# Patient Record
Sex: MALE | Race: WHITE
[De-identification: names, ages, dates, MRNs, and addresses within clinical notes are randomized per-mention and may not be internally consistent; named-entity substitution may affect disease eponyms.]

---

## 2018-08-16 ENCOUNTER — HOSPITAL ENCOUNTER (INPATIENT)
Dept: HOSPITAL 97 - ER | Age: 53
LOS: 1 days | Discharge: HOME | DRG: 281 | End: 2018-08-17
Attending: INTERNAL MEDICINE | Admitting: INTERNAL MEDICINE
Payer: SELF-PAY

## 2018-08-16 VITALS — BODY MASS INDEX: 31.2 KG/M2

## 2018-08-16 DIAGNOSIS — I10: ICD-10-CM

## 2018-08-16 DIAGNOSIS — I25.728: ICD-10-CM

## 2018-08-16 DIAGNOSIS — Z95.5: ICD-10-CM

## 2018-08-16 DIAGNOSIS — E78.5: ICD-10-CM

## 2018-08-16 DIAGNOSIS — I21.4: Primary | ICD-10-CM

## 2018-08-16 DIAGNOSIS — F17.200: ICD-10-CM

## 2018-08-16 DIAGNOSIS — K21.9: ICD-10-CM

## 2018-08-16 DIAGNOSIS — I25.718: ICD-10-CM

## 2018-08-16 DIAGNOSIS — I25.2: ICD-10-CM

## 2018-08-16 LAB
BUN BLD-MCNC: 11 MG/DL (ref 7–18)
GLUCOSE SERPLBLD-MCNC: 158 MG/DL (ref 74–106)
HCT VFR BLD CALC: 43.1 % (ref 39.6–49)
INR BLD: 0.97
LYMPHOCYTES # SPEC AUTO: 1.9 K/UL (ref 0.7–4.9)
MAGNESIUM SERPL-MCNC: 2.1 MG/DL (ref 1.8–2.4)
MCH RBC QN AUTO: 29.9 PG (ref 27–35)
MCV RBC: 86.5 FL (ref 80–100)
NT-PROBNP SERPL-MCNC: 195 PG/ML (ref ?–125)
PMV BLD: 9 FL (ref 7.6–11.3)
POTASSIUM SERPL-SCNC: 3.8 MMOL/L (ref 3.5–5.1)
RBC # BLD: 4.98 M/UL (ref 4.33–5.43)
UA DIPSTICK W REFLEX MICRO PNL UR: (no result)

## 2018-08-16 PROCEDURE — 96372 THER/PROPH/DIAG INJ SC/IM: CPT

## 2018-08-16 PROCEDURE — 80048 BASIC METABOLIC PNL TOTAL CA: CPT

## 2018-08-16 PROCEDURE — 81003 URINALYSIS AUTO W/O SCOPE: CPT

## 2018-08-16 PROCEDURE — 71045 X-RAY EXAM CHEST 1 VIEW: CPT

## 2018-08-16 PROCEDURE — 84484 ASSAY OF TROPONIN QUANT: CPT

## 2018-08-16 PROCEDURE — 83880 ASSAY OF NATRIURETIC PEPTIDE: CPT

## 2018-08-16 PROCEDURE — 83735 ASSAY OF MAGNESIUM: CPT

## 2018-08-16 PROCEDURE — 85610 PROTHROMBIN TIME: CPT

## 2018-08-16 PROCEDURE — 93005 ELECTROCARDIOGRAM TRACING: CPT

## 2018-08-16 PROCEDURE — 93306 TTE W/DOPPLER COMPLETE: CPT

## 2018-08-16 PROCEDURE — 85025 COMPLETE CBC W/AUTO DIFF WBC: CPT

## 2018-08-16 PROCEDURE — 99285 EMERGENCY DEPT VISIT HI MDM: CPT

## 2018-08-16 PROCEDURE — 36415 COLL VENOUS BLD VENIPUNCTURE: CPT

## 2018-08-16 PROCEDURE — 93458 L HRT ARTERY/VENTRICLE ANGIO: CPT

## 2018-08-16 RX ADMIN — BUSPIRONE HYDROCHLORIDE SCH MG: 15 TABLET ORAL at 20:35

## 2018-08-16 RX ADMIN — CARVEDILOL SCH MG: 3.12 TABLET, FILM COATED ORAL at 20:35

## 2018-08-16 NOTE — EKG
Test Date:    2018-08-16               Test Time:    10:55:01

Technician:   GREGORIO                                     

                                                     

MEASUREMENT RESULTS:                                       

Intervals:                                           

Rate:         57                                     

WA:           142                                    

QRSD:         76                                     

QT:           414                                    

QTc:          402                                    

Axis:                                                

P:            29                                     

WA:           142                                    

QRS:          -20                                    

T:            84                                     

                                                     

INTERPRETIVE STATEMENTS:                                       

                                                     

Sinus bradycardia

Inferior infarct, age undetermined

Abnormal ECG

No previous ECG available for comparison



Electronically Signed On 08-16-18 11:36:29 CDT by Kaleb Campos

## 2018-08-16 NOTE — P.HP
Certification for Inpatient


Patient admitted to: Inpatient


With expected LOS: >2 Midnights


Patient will require the following post-hospital care: None


Practitioner: I am a practitioner with admitting privileges, knowledge of 

patient current condition, hospital course, and medical plan of care.


Services: Services provided to patient in accordance with Admission 

requirements found in Title 42 Section 412.3 of the Code of Federal Regulations





Patient History


Date of Service: 08/16/18


Reason for admission: chest pain


History of Present Illness: 





54 y/o man with HTN CAD s/p CABG(5 years ago) who presented ER chest pain this 

morning. Crushing, heaviness, associated diaphoresis. 8/1-10. Lasted hours, 

subsided in ER. First tropnin 0.12. When I saw him in ER, he has no more chest 

pain.





States he was doing well after CABG but he has not seen a cardiology for years. 


Allergies





No Known Allergies Allergy (Unverified 08/16/18 13:28)


 





Home Medications: 








Atorvastatin Calcium [Lipitor] 80 mg PO DAILY 08/16/18 


Buspirone HCl 1 tab PO BID 08/16/18 


Carvedilol 3.125 mg PO BID 08/16/18 


Clopidogrel Bisulfate [Plavix] 75 mg PO DAILY 08/16/18 


Hydrocodone 10/APAP 325 [Norco 10/325*] 1 tab PO BID PRN 08/16/18 


Loratadine [Claritin] 10 mg PO DAILY 08/16/18 


Omeprazole 20 mg PO BEDTIME 08/16/18 








- Past Medical/Surgical History


Has patient received pneumonia vaccine in the past: Yes


Diabetic: No


-: MI


-: CABG


-: Knee Sx





- Family History


  ** Father


History Unknown: Yes





- Social History


Smoking Status: Current every day smoker


Alcohol use: No


CD- Drugs: No


Caffeine use: Yes


Place of Residence: Home





Review of Systems


10-point ROS is otherwise unremarkable





Physical Examination





- Vital Signs


Temperature: 97.7 F


Blood Pressure: 141/82


Pulse: 66


Respirations: 16


Pulse Ox (%): 98





- Physical Exam


General: Alert, In no apparent distress


HEENT: Atraumatic, PERRLA, Mucous membr. moist/pink, EOMI, Sclerae nonicteric


Neck: Supple, 2+ carotid pulse no bruit, No LAD, Without JVD or thyroid 

abnormality


Respiratory: Clear to auscultation bilaterally, Normal air movement


Cardiovascular: Regular rate/rhythm, Normal S1 S2


Gastrointestinal: Normal bowel sounds, No tenderness


Musculoskeletal: No tenderness


Integumentary: No rashes


Neurological: Normal gait, Normal speech, Normal strength at 5/5 x4 extr, 

Normal tone, Normal affect


Lymphatics: No axilla or inguinal lymphadenopathy





- Studies


Laboratory Data (last 24 hrs)





08/16/18 11:10: PT 11.4, INR 0.97


08/16/18 11:10: WBC 8.2, Hgb 14.9, Hct 43.1, Plt Count 276


08/16/18 11:10: Sodium 140, Potassium 3.8, BUN 11, Creatinine 0.90, Glucose 158 

H, Magnesium 2.1








Assessment and Plan





- Problems (Diagnosis)


(1) Chest pain


Current Visit: Yes   Status: Acute   


Qualifiers: 


   Chest pain type: precordial pain   Qualified Code(s): R07.2 - Precordial 

pain   





(2) CAD (coronary artery disease) of artery bypass graft


Current Visit: Yes   Status: Chronic   


Qualifiers: 


   Native vs. transplanted heart: native heart   Associated angina: with stable 

angina   Qualified Code(s): I25.708 - Atherosclerosis of coronary artery bypass 

graft(s), unspecified, with other forms of angina pectoris   





(3) Hypertension


Current Visit: Yes   Status: Chronic   


Qualifiers: 


   Hypertension type: essential hypertension   Qualified Code(s): I10 - 

Essential (primary) hypertension   





- Plan





--ASA/Plaxix


--Lovenox


--Troponins x3


--ECHO


--Morphine PRN


--Resume home meds


--Cons Cardilogy





- Advance Directives


Does patient have a Living Will: No


Does patient have a Durable POA for Healthcare: No

## 2018-08-16 NOTE — RAD REPORT
EXAM DESCRIPTION:  RAD - Chest Single View - 8/16/2018 11:31 am

 

CLINICAL HISTORY:  PAIN

Chest pain.

 

COMPARISON:  No comparisons

 

FINDINGS:  Portable technique limits examination quality.

 

The lungs are grossly clear. The heart is normal in size. No displaced fractures.Sternotomy wires.

 

IMPRESSION:  No acute intrathoracic process suspected.

## 2018-08-16 NOTE — ER
Nurse's Notes                                                                                     

 [facility]                                                                

Name: Dayne Wells                                                                                  

Age: 53 yrs                                                                                       

Sex: Male                                                                                         

: 1965                                                                                   

MRN: [id number]                                                                                   

Arrival Date: 2018                                                                          

Time: 10:44                                                                                       

Account#: [id number]                                                                            

Bed 20                                                                                            

Private MD:                                                                                       

Diagnosis: Unstable angina                                                                        

                                                                                                  

Presentation:                                                                                     

                                                                                             

10:48 Presenting complaint: Patient states: c/o chest pressure that radiates to the left jaw. rb1 

      Pain is 6/10. Feels lightheaded, dizzy and is diaphoretic. Transition of care: patient      

      was not received from another setting of care. Risk Assessment: Do you want to hurt         

      yourself or someone else? Patient reports no desire to harm self or others. Initial         

      Sepsis Screen: Does the patient meet any 2 criteria? No. Patient's initial sepsis           

      screen is negative. Does the patient have a suspected source of infection? No.              

      Patient's initial sepsis screen is negative. Care prior to arrival: None.                   

10:48 Method Of Arrival: Wheelchair                                                           rb1 

10:48 Acuity: RENEE 3                                                                           rb1 

10:48 Onset of symptoms was 2018 at 05:30.                                         rb1 

                                                                                                  

Triage Assessment:                                                                                

10:48 General: Appears uncomfortable, Behavior is calm, cooperative, Denies fever. Pain:      rb1 

      Complains of pain in mid-sternal area Pain radiates to left jaw Pain currently is 6 out     

      of 10 on a pain scale. Quality of pain is described as pressure. Neuro: Level of            

      Consciousness is awake, alert, obeys commands, Oriented to person, place, time,             

      situation. Neuro: Reports dizziness, lightheaded. Cardiovascular: Capillary refill < 3      

      seconds is brisk in bilateral fingers Rhythm is sinus rhythm. Respiratory: Airway is        

      patent Respiratory effort is even, unlabored, Respiratory pattern is regular,               

      symmetrical. GI: No signs and/or symptoms were reported involving the gastrointestinal      

      system. : No signs and/or symptoms were reported regarding the genitourinary system.      

      Derm: Skin is diaphoretic, Skin is normal, Skin temperature is warm. Musculoskeletal:       

      Range of motion: intact in all extremities.                                                 

                                                                                                  

Historical:                                                                                       

- Allergies:                                                                                      

10:48 No Known Allergies;                                                                     rb1 

- Home Meds:                                                                                      

10:48 Coreg Oral [Active]; Lisinopril Oral [Active]; Plavix Oral [Active]; atorvastatin oral  rb1 

      oral [Active];                                                                              

- PMHx:                                                                                           

10:48 Myocardial infarction;                                                                  rb1 

- PSHx:                                                                                           

10:48 CABG; Knee surgery;                                                                     rb1 

                                                                                                  

- Immunization history:: Adult Immunizations up to date.                                          

- Social history:: Smoking status: Patient uses tobacco products, SMOKES MARIJUANA                

  DAILY.                                                                                          

- Ebola Screening: : Patient negative for fever greater than or equal to 101.5 degrees            

  Fahrenheit, and additional compatible Ebola Virus Disease symptoms.                             

                                                                                                  

                                                                                                  

Screening:                                                                                        

10:48 Abuse screen: Denies threats or abuse. Nutritional screening: No deficits noted.        rb1 

      Tuberculosis screening: No symptoms or risk factors identified. Fall Risk None              

      identified.                                                                                 

                                                                                                  

Assessment:                                                                                       

10:48 General: See triage assessment.                                                         rb1 

10:48 Pain: Pain began 0530 this morning.                                                     rb1 

11:47 Reassessment: Patient appears in no apparent distress at this time. Patient and/or      rb1 

      family updated on plan of care and expected duration. Pain level reassessed. Patient is     

      alert, oriented x 3, equal unlabored respirations, skin warm/dry/pink. Patient states       

      feeling better.                                                                             

12:47 Reassessment: Patient appears in no apparent distress at this time. No changes from     rb1 

      previously documented assessment. Family at bedside.                                        

13:35 Reassessment: Patient appears in no apparent distress at this time. Patient and/or      rb1 

      family updated on plan of care and expected duration. Pain level reassessed. Patient is     

      alert, oriented x 3, equal unlabored respirations, skin warm/dry/pink. Call light           

      within reach.                                                                               

14:15 Reassessment: Patient appears in no apparent distress at this time. No changes from     rb1 

      previously documented assessment.                                                           

14:35 Reassessment: Called report to CORRINE Coles. Information from the SBAR was given. All        rb1 

      questions asked and answered.                                                               

15:15 Reassessment: Patient appears in no apparent distress at this time. Patient and/or      rb1 

      family updated on plan of care and expected duration. Pain level reassessed. Patient is     

      alert, oriented x 3, equal unlabored respirations, skin warm/dry/pink. Pt. is waiting       

      to be taken to the floor. We have to wait for someone to be available to transport the      

      pt.                                                                                         

                                                                                                  

Vital Signs:                                                                                      

10:48  / 86; Pulse 64; Resp 15; Pulse Ox 98% on R/A; Weight 82.55 kg; Height 5 ft. 4    rb1 

      in. (162.56 cm); Pain 6/10;                                                                 

11:47  / 69; Pulse 57; Resp 12; Pulse Ox 98% on R/A;                                    rb1 

12:45  / 86; Pulse 68; Resp 13; Pulse Ox 97% ;                                          rb1 

13:38  / 80; Pulse 72; Resp 14; Pulse Ox 97% on R/A;                                    rb1 

14:15  / 79; Pulse 71; Resp 21; Pulse Ox 97% on R/A;                                    rb1 

15:15  / 80; Pulse 68; Resp 19; Pulse Ox 98% on R/A;                                    rb1 

10:48 Body Mass Index 31.24 (82.55 kg, 162.56 cm)                                             rb1 

                                                                                                  

ED Course:                                                                                        

10:44 Patient arrived in ED.                                                                  tw3 

10:48 Patient has correct armband on for positive identification. Placed in gown. Bed in low  rb1 

      position. Call light in reach. Side rails up X 1. Cardiac monitor on. Pulse ox on. NIBP     

      on.                                                                                         

10:48 Arm band placed on right wrist.                                                         rb1 

10:48 Patient maintains SpO2 saturation greater than 95% on room air.                         rb1 

10:50 Filemon May MD is Attending Physician.                                              gs  

10:53 Makenna Quan RN is Primary Nurse.                                                   rb1 

10:56 Triage completed.                                                                       rb1 

11:10 Inserted saline lock: 18 gauge in right antecubital area, using aseptic technique.      rb1 

      ,using aseptic technique. Inserted by JUNE Tafoya. Blood collected.                     

11:12 EKG done, by EKG tech. reviewed by Filemon May MD.                                    at1 

11:22 X-ray completed. Portable x-ray completed in exam room. Patient tolerated procedure     ml  

      well.                                                                                       

11:32 XRAY Chest (1 view) In Process Unspecified.                                             EDMS

12:35 Yusuf Neri MD is Hospitalizing Provider.                                              gs  

15:25 No provider procedures requiring assistance completed. Patient admitted, IV remains in  rb1 

      place.                                                                                      

                                                                                                  

Administered Medications:                                                                         

12:55 Drug: Aspirin Chewable Tablet 324 mg Route: PO;                                         rb1 

13:14 Follow up: Response: No adverse reaction                                                rb1 

13:33 Drug: Lovenox 1 mg/kg Route: Sub-Q; Site: right lower abdomen;                          rb1 

13:58 Follow up: Response: No adverse reaction                                                rb1 

                                                                                                  

                                                                                                  

Outcome:                                                                                          

12:35 Decision to Hospitalize by Provider.                                                    gs  

15:25 Patient left the ED.                                                                    rb1 

15:25 Admitted to Tele accompanied by tech, family with patient, via stretcher, room 407,     rb1 

      with chart, Report called to  CORRINE Coles                                                       

15:25 Condition: stable                                                                       rb1 

15:25 Instructed on the need for admit.                                                           

                                                                                                  

Signatures:                                                                                       

Dispatcher [employer]                           EDMS                                                 

Flavia Henry Amanda, EKG Tech              EKG Tat1                                                  

Makenna Quan, RN                     RN   rb1                                                  

Abdulaziz, Pam                                    tw3                                                  

Filemon May MD MD   gs                                                   

                                                                                                  

Corrections: (The following items were deleted from the chart)                                    

15:34 15:33 Patient left the ED. rb1                                                          rb1 

                                                                                                  

**************************************************************************************************

## 2018-08-17 VITALS — OXYGEN SATURATION: 100 %

## 2018-08-17 VITALS — DIASTOLIC BLOOD PRESSURE: 80 MMHG | SYSTOLIC BLOOD PRESSURE: 109 MMHG | TEMPERATURE: 97.2 F

## 2018-08-17 PROCEDURE — B2111ZZ FLUOROSCOPY OF MULTIPLE CORONARY ARTERIES USING LOW OSMOLAR CONTRAST: ICD-10-PCS

## 2018-08-17 PROCEDURE — B2131ZZ FLUOROSCOPY OF MULTIPLE CORONARY ARTERY BYPASS GRAFTS USING LOW OSMOLAR CONTRAST: ICD-10-PCS

## 2018-08-17 PROCEDURE — 4A023N7 MEASUREMENT OF CARDIAC SAMPLING AND PRESSURE, LEFT HEART, PERCUTANEOUS APPROACH: ICD-10-PCS

## 2018-08-17 PROCEDURE — B2151ZZ FLUOROSCOPY OF LEFT HEART USING LOW OSMOLAR CONTRAST: ICD-10-PCS

## 2018-08-17 RX ADMIN — CARVEDILOL SCH MG: 3.12 TABLET, FILM COATED ORAL at 05:43

## 2018-08-17 RX ADMIN — BUSPIRONE HYDROCHLORIDE SCH MG: 15 TABLET ORAL at 09:44

## 2018-08-17 NOTE — P.DS
Admission Date: 08/16/18


Discharge Date: 08/17/18


Disposition: ROUTINE DISCHARGE


Discharge Condition: GOOD


Reason for Admission: chest pain





- Problems


(1) Chest pain


Onset Date: 08/17/18   Current Visit: Yes   Status: Acute   


Qualifiers: 


   Chest pain type: precordial pain   Qualified Code(s): R07.2 - Precordial 

pain   





(2) CAD (coronary artery disease) of artery bypass graft


Onset Date: 08/17/18   Current Visit: Yes   Status: Chronic   


Qualifiers: 


   Native vs. transplanted heart: native heart   Associated angina: with stable 

angina   Qualified Code(s): I25.708 - Atherosclerosis of coronary artery bypass 

graft(s), unspecified, with other forms of angina pectoris   





(3) Hypertension


Onset Date: 08/17/18   Current Visit: Yes   Status: Chronic   


Qualifiers: 


   Hypertension type: essential hypertension   Qualified Code(s): I10 - 

Essential (primary) hypertension   


Brief History of Present Illness: 





52 y/o man with HTN CAD s/p CABG(5 years ago) who presented ER chest pain this 

morning. Crushing, heaviness, associated diaphoresis. 8/1-10. Lasted hours, 

subsided in ER. First tropnin 0.12. When I saw him in ER, he has no more chest 

pain.





States he was doing well after CABG but he has not seen a cardiology for years. 


Hospital Course: 





He has no more episodes of chest pain. His tropnin trended up. He underwent 

cardiac cath today without complication. Final result pending but no 

significant lesions that can be steneted, leaving medical management. He is 

discharged home in stable condition. 


Vital Signs/Physical Exam: 














Temp Pulse Resp BP Pulse Ox


 


 97.2 F   75   18   109/80   97 


 


 08/17/18 12:00  08/17/18 12:00  08/17/18 12:00  08/17/18 12:00  08/17/18 12:00








General: Alert, In no apparent distress


HEENT: Atraumatic, PERRLA, EOMI


Neck: Supple, JVD not distended


Respiratory: Clear to auscultation bilaterally, Normal air movement


Cardiovascular: Regular rate/rhythm, Normal S1 S2


Gastrointestinal: Normal bowel sounds, No tenderness


Musculoskeletal: No tenderness


Integumentary: No rashes


Neurological: Normal speech, Normal tone, Normal affect


Lymphatics: No axilla or inguinal lymphadenopathy


Laboratory Data at Discharge: 














WBC  8.2 K/uL (4.3-10.9)   08/16/18  11:10    


 


Hgb  14.9 g/dL (13.6-17.9)   08/16/18  11:10    


 


Hct  43.1 % (39.6-49.0)   08/16/18  11:10    


 


Plt Count  276 K/uL (152-406)   08/16/18  11:10    


 


PT  11.4 SECONDS (9.5-12.5)   08/16/18  11:10    


 


INR  0.97   08/16/18  11:10    


 


Sodium  140 mmol/L (136-145)   08/16/18  11:10    


 


Potassium  3.8 mmol/L (3.5-5.1)   08/16/18  11:10    


 


BUN  11 mg/dL (7-18)   08/16/18  11:10    


 


Creatinine  0.90 mg/dL (0.55-1.3)   08/16/18  11:10    


 


Glucose  158 mg/dL ()  H  08/16/18  11:10    


 


Magnesium  2.1 mg/dL (1.8-2.4)   08/16/18  11:10    


 


Troponin I  0.36 ng/mL (0.0-0.045)  H  08/17/18  05:19    








Home Medications: 








Atorvastatin Calcium [Lipitor] 80 mg PO DAILY 08/16/18 


Buspirone HCl 1 tab PO BID 08/16/18 


Carvedilol 3.125 mg PO BID 08/16/18 


Clopidogrel Bisulfate [Plavix*] 75 mg PO DAILY 08/16/18 


Hydrocodone 10/APAP 325 [Norco 10/325*] 1 tab PO BID PRN 08/16/18 


Loratadine [Claritin*] 10 mg PO DAILY 08/16/18 


Omeprazole 20 mg PO BEDTIME 08/16/18 





Diet: ADA


Activity: Ad berta


Followup: 


Stew Cabrera MD [ACTIVE - CAN ADMIT] - 


Time spent managing pt's care (in minutes): 35

## 2018-08-17 NOTE — OP
Date of Procedure:  08/17/2018



Surgeon:  Stew Cabrera MD



Admitted to Dr. Neri's service on 08/16/2018.  The patient was brought into the cath lab on 08/17/20
18 because of non-ST elevation myocardial infarction.



Procedures:  Left heart catheterization, injection of the left internal mammary artery and saphenous 
vein graft to the right coronary artery and obtuse marginal.



Indication:  Non-ST elevation MI.



Procedure In Detail:  The patient was prepped and draped in the routine sterile fashion, given 2 mg o
f Versed and 25 of fentanyl for sedation.  A 6-[place] Angelique catheter were used to select the left m
ain and the right main.  He had a 99% ostial left main.  He had a 70% stenosis in the mid and distal 
RCA.  He had a stent in the circumflex with 100% occlusion.  The JR4 catheter was used to select the 
LIMA and vein graft.  The LIMA was open to the LAD, was connected to the distal LAD.  The saphenous v
ein graft to the OM 1 was opened.  Saphenous vein graft to the RCA distal was open.  LV-gram was done
 using a pigtail showing mild global hypokinesis.  End-diastolic pressure was 5.  Ejection fraction 4
5%.  Blood pressure was 111/77.  There were no complications.



Blood Loss:  5 cc.



Postoperative Diagnosis:  Severe coronary artery disease.



Plan:  For medical therapy.  The patient will be observed for 2 hours and sent home.  I will adjust h
is medical regimen.  He can go home today and I will see him in the office in 2 weeks.





NB/MODL

DD:  08/17/2018 07:36:42Voice ID:  [id number]

DT:  08/17/2018 18:20:39Report ID:  [id number]

## 2018-08-17 NOTE — CON
Date of Consultation:  08/17/2018



Reason For Consultation:  Non-ST-elevation myocardial infarction.



History Of Present Illness:  Mr. Wells is a 53-year-old white male.  He is on disability because of co
ronary artery disease.  He has had 6 MIs in the past, multiple stents, finally had a bypass surgery a
bout 3-1/2 years ago.  No procedures after that.  He has a history of dyslipidemia and gastroesophage
al reflux disease as well as hypertension.  His surgery was done by Dr. Guero Rapp in [place]. 
 He comes in with chest pressure and tightness in the chest and the midepigastric region without any 
nausea, vomiting.  He had diaphoresis, but no shortness of breath.  In the emergency room, his EKG sh
owed normal sinus rhythm with old inferior MI.  Chest x-ray was negative.  All his labs were negative
 except for a troponin of 0.87.



Past Medical History:  As stated above.



Allergies:  NONE.



Review of Systems:

Negative.



Social History:  Negative.



Family History:  Positive for heart disease.



Medications:  At home include Plavix, Coreg 3.125 mg b.i.d., Lipitor, and Prilosec.



Physical Examination:

Vital Signs:  Stable.  He is afebrile. 

HEENT:  Negative. 

Neck:  Supple.  No bruit. 

Chest:  Clear to auscultation and percussion. 

Cardiac:  Revealed a regular rhythm and rate without murmurs, gallops, or rubs. 

Abdomen:  Benign. 

Extremities:  Revealed no clubbing, cyanosis, or edema.



Diagnostic Data:  As stated earlier.



Impression And Plan:  

1.Non-ST elevation myocardial infarction.

2.History of coronary artery disease status post coronary artery bypass grafting and stents.

3.Gastroesophageal reflux disease.

4.Dyslipidemia. 

An echocardiogram is pending that was ordered by Dr. Neri.  A heart catheterization will be done tod
ay to rule out a stenosis in the graft or in the mammary or any worsening disease in his native coron
mk.  The patient understands the risk and the benefit of the procedure and he agrees to proceed.  
We will continue his Plavix and Lipitor and Prilosec as well as Coreg.  We will probably have to incr
ease his Coreg.  Eventually, he may be a candidate for Imdur.  We will see what his coronaries look l
quang first.





NB/MODL

DD:  08/17/2018 08:07:44Voice ID:  [id number]

DT:  08/17/2018 13:13:43Report ID:  [id number]

## 2018-08-20 NOTE — ECHO
HEIGHT: 5 ft 4 in   WEIGHT: 182 lb 0 oz   DATE OF STUDY: 08/17/2018   REFER DR: Yusuf Neri MD

2-DIMENSIONAL: YES

     M.MODE: YES

 DOPPLER: YES

COLOR FLOW: YES



                    TDS:  NO

PORTABLE: NO

 DEFINITY:  NO

BUBBLE STUDY: NO





DIAGNOSIS:  CHEST PAIN



CARDIAC HISTORY:  

CATHERIZATION: YES

SURGERY: YES

PROSTHETIC VALVE: NO

PACEMAKER: NO





MEASUREMENTS (cm)

    DIASTOLIC (NORMALS)                 SYSTOLIC (NORMALS)

IVSd                 1.1 (0.6-1.2)                    LA Diam 3.5 (1.9-4.0)     LVEF       
  61%  

LVIDd               4.4 (3.5-5.7)                        LVIDs      3.0 (2.0-3.5)     %FS  
        32%

LVPWd             1.3 (0.6-1.2)

Ao Diam           2.9 (2.0-3.7)



2 DIMENSIONAL ASSESSMENT:

RIGHT ATRIUM:                   NORMAL

LEFT ATRIUM:       NORMAL



RIGHT VENTRICLE:            NORMAL

LEFT VENTRICLE: NORMAL



TRICUSPID VALVE:             NORMAL

MITRAL VALVE:     NORMAL



PULMONIC VALVE:             NORMAL

AORTIC VALVE:     NORMAL



PERICARDIAL EFFUSION: NONE

AORTIC ROOT:      NORMAL





LEFT VENTRICULAR WALL MOTION:     NORMAL



DOPPLER/COLOR FLOW:     NORMAL



COMMENTS:      NORMAL 2D ECHOCARDIOGRAM WITH DOPPLER. NO WALL MOTION ABNORMALITY. NO 
EFFUSION.



TECHNOLOGIST:   KIMBERLEY ANGELA

## 2020-11-25 LAB
BUN BLD-MCNC: 12 MG/DL (ref 7–18)
GLUCOSE SERPLBLD-MCNC: 118 MG/DL (ref 74–106)
HCT VFR BLD CALC: 41.4 % (ref 39.6–49)
INR BLD: 0.92
LYMPHOCYTES # SPEC AUTO: 2.3 K/UL (ref 0.7–4.9)
PMV BLD: 8.3 FL (ref 7.6–11.3)
POTASSIUM SERPL-SCNC: 4.1 MMOL/L (ref 3.5–5.1)
RBC # BLD: 4.77 M/UL (ref 4.33–5.43)

## 2020-11-25 NOTE — RAD REPORT
EXAM DESCRIPTION:  Tim Patton (2 Views)11/25/2020 9:53 am

 

CLINICAL HISTORY:  Preop for knee replacement

 

COMPARISON:  2018

 

FINDINGS:   The lungs appear clear of acute infiltrate. The heart is normal size. Post surgical larios
es involve the chest

 

IMPRESSION:   No acute abnormalities displayed

## 2020-12-02 ENCOUNTER — HOSPITAL ENCOUNTER (OUTPATIENT)
Dept: HOSPITAL 97 - OR | Age: 55
Setting detail: OBSERVATION
LOS: 1 days | Discharge: HOME HEALTH | End: 2020-12-03
Attending: ORTHOPAEDIC SURGERY | Admitting: ORTHOPAEDIC SURGERY
Payer: COMMERCIAL

## 2020-12-02 VITALS — OXYGEN SATURATION: 94 %

## 2020-12-02 VITALS — BODY MASS INDEX: 31.7 KG/M2

## 2020-12-02 DIAGNOSIS — Z95.1: ICD-10-CM

## 2020-12-02 DIAGNOSIS — E78.5: ICD-10-CM

## 2020-12-02 DIAGNOSIS — F41.9: ICD-10-CM

## 2020-12-02 DIAGNOSIS — M81.0: ICD-10-CM

## 2020-12-02 DIAGNOSIS — R06.83: ICD-10-CM

## 2020-12-02 DIAGNOSIS — Z79.84: ICD-10-CM

## 2020-12-02 DIAGNOSIS — Z79.82: ICD-10-CM

## 2020-12-02 DIAGNOSIS — I10: ICD-10-CM

## 2020-12-02 DIAGNOSIS — M17.12: Primary | ICD-10-CM

## 2020-12-02 DIAGNOSIS — F17.210: ICD-10-CM

## 2020-12-02 DIAGNOSIS — E11.9: ICD-10-CM

## 2020-12-02 DIAGNOSIS — I25.10: ICD-10-CM

## 2020-12-02 DIAGNOSIS — Z20.828: ICD-10-CM

## 2020-12-02 LAB
HCT VFR BLD CALC: 38.7 % (ref 39.6–49)
UA DIPSTICK W REFLEX MICRO PNL UR: (no result)

## 2020-12-02 PROCEDURE — 81015 MICROSCOPIC EXAM OF URINE: CPT

## 2020-12-02 PROCEDURE — 97530 THERAPEUTIC ACTIVITIES: CPT

## 2020-12-02 PROCEDURE — 85610 PROTHROMBIN TIME: CPT

## 2020-12-02 PROCEDURE — 97161 PT EVAL LOW COMPLEX 20 MIN: CPT

## 2020-12-02 PROCEDURE — 0SRD0J9 REPLACEMENT OF LEFT KNEE JOINT WITH SYNTHETIC SUBSTITUTE, CEMENTED, OPEN APPROACH: ICD-10-PCS

## 2020-12-02 PROCEDURE — 97139 UNLISTED THERAPEUTIC PX: CPT

## 2020-12-02 PROCEDURE — 73560 X-RAY EXAM OF KNEE 1 OR 2: CPT

## 2020-12-02 PROCEDURE — 88304 TISSUE EXAM BY PATHOLOGIST: CPT

## 2020-12-02 PROCEDURE — 97110 THERAPEUTIC EXERCISES: CPT

## 2020-12-02 PROCEDURE — 88305 TISSUE EXAM BY PATHOLOGIST: CPT

## 2020-12-02 PROCEDURE — 36415 COLL VENOUS BLD VENIPUNCTURE: CPT

## 2020-12-02 PROCEDURE — 80048 BASIC METABOLIC PNL TOTAL CA: CPT

## 2020-12-02 PROCEDURE — 85018 HEMOGLOBIN: CPT

## 2020-12-02 PROCEDURE — 71046 X-RAY EXAM CHEST 2 VIEWS: CPT

## 2020-12-02 PROCEDURE — 82947 ASSAY GLUCOSE BLOOD QUANT: CPT

## 2020-12-02 PROCEDURE — 93005 ELECTROCARDIOGRAM TRACING: CPT

## 2020-12-02 PROCEDURE — 81003 URINALYSIS AUTO W/O SCOPE: CPT

## 2020-12-02 PROCEDURE — 87086 URINE CULTURE/COLONY COUNT: CPT

## 2020-12-02 PROCEDURE — 97116 GAIT TRAINING THERAPY: CPT

## 2020-12-02 PROCEDURE — 85014 HEMATOCRIT: CPT

## 2020-12-02 PROCEDURE — 88311 DECALCIFY TISSUE: CPT

## 2020-12-02 PROCEDURE — 27447 TOTAL KNEE ARTHROPLASTY: CPT

## 2020-12-02 PROCEDURE — 85025 COMPLETE CBC W/AUTO DIFF WBC: CPT

## 2020-12-02 PROCEDURE — 87088 URINE BACTERIA CULTURE: CPT

## 2020-12-02 PROCEDURE — 85730 THROMBOPLASTIN TIME PARTIAL: CPT

## 2020-12-02 PROCEDURE — 94010 BREATHING CAPACITY TEST: CPT

## 2020-12-02 RX ADMIN — CEFAZOLIN SCH MLS: 1 INJECTION, POWDER, FOR SOLUTION INTRAMUSCULAR; INTRAVENOUS; PARENTERAL at 16:29

## 2020-12-02 RX ADMIN — BUPIVACAINE HYDROCHLORIDE AND EPINEPHRINE ONE ML: 5; 5 INJECTION, SOLUTION EPIDURAL; INTRACAUDAL; PERINEURAL at 08:19

## 2020-12-02 RX ADMIN — METFORMIN HYDROCHLORIDE SCH MG: 500 TABLET, FILM COATED ORAL at 16:28

## 2020-12-02 RX ADMIN — HYDROCODONE BITARTRATE AND ACETAMINOPHEN PRN TAB: 7.5; 325 TABLET ORAL at 16:35

## 2020-12-02 RX ADMIN — HYDROCODONE BITARTRATE AND ACETAMINOPHEN PRN TAB: 7.5; 325 TABLET ORAL at 21:19

## 2020-12-02 RX ADMIN — BUPIVACAINE HYDROCHLORIDE AND EPINEPHRINE ONE ML: 5; 5 INJECTION, SOLUTION EPIDURAL; INTRACAUDAL; PERINEURAL at 09:50

## 2020-12-02 NOTE — RAD REPORT
EXAM DESCRIPTION:  RAD - Knee Left 2 View - 12/2/2020 11:09 am

 

CLINICAL HISTORY:  Post Op

Knee pain and swelling

 

COMPARISON:  Knee Left Wo Cont dated 10/23/2020

 

FINDINGS:  Total knee arthroplasty has been performed. No unexpected hardware finding. Air is present
 in the joint. Multiple skin staples are seen.

## 2020-12-02 NOTE — XMS REPORT
Continuity of Care Document

                           Created on:2020



Patient:GREYSON KWAN

Sex:Male

:1965

External Reference #:[id number]





Demographics







                          Address                   [address] [place] [address]

 

                          Home Phone                [phone]

 

                          Mobile Phone              [phone]

 

                          Email Address             [email or web address]

 

                          Preferred Language        en

 

                          Marital Status            Unknown

 

                          Anabaptist Affiliation     Unknown

 

                          Race                      Unknown

 

                          Additional Race(s)        Unavailable

 

                          Ethnic Group              Unknown









Author







                          Organization              [facility]

t

 

                          Address                   [address]



                                                    [place] [address]

 

                          Phone                     [phone]









Support







                Name            Relationship    Address         Phone

 

                Russell            Unavailable     [address] [facility] [phone]



                                                [place] [address] 

 

                Russell            Unavailable     Unavailable     [phone]









Care Team Providers







                    Name                Role                Phone

 

                    Unavailable         Unavailable         Unavailable









Problems

This patient has no known problems.



Allergies, Adverse Reactions, Alerts

This patient has no known allergies or adverse reactions.



Medications







       Ordered Filled Start  Stop   Current Ordering Indication Dosage Frequency

 Signature

                    Comments            Components          Source



     Medication Medication Date Date Medication? Clinician                (SIG) 

          



     Name Name                                                   

 

     Metformin Metformin       Yes  Na Cowan                1 tablet       

    [facility]



     HCl  HCl  5-12                               with a           [facility]



               [facility]:                               meal           [facility]



               00                                                l



                                                                 Outpati



                                                                 ent



                                                                 Clinics

 

     Flonase Flonase 0      Yes  Na [facility]an                2 spray in         

  [facility]



               2-12                               each           [facility]



               00[facility]:                               nostril           [facility]



                                                                 [facility]

 

     Cetirizine Cetirizine  2020- No   Na [facility]                1 tablet    

       [facility]



     HCl  HCl  2-12 08-10                                         [facility]



               00[facility]00                                         [facility]



               00   :00                                          l



                                                                 Outpati



                                                                 ent



                                                                 Clinics

 

     Albuterol Albuterol 0      Yes  Na [facility]                2 puffs        

   [facility]



     Sulfate HFA Sulfate HFA 1-29                                              L

[facility]



               00:00:                                              [facility]



               00                                                l



                                                                 Outpati



                                                                 ent



                                                                 Clinics

 

     LISINOPRIL LISINOPRIL 2018 2020- No   Na Cowan                ONE         

   [facility]



               1-05 05-04                                         [facility]



               00[facility]00                                         [facility]



               00   [facility]                                          [facility]



                                                                 [facility]

 

     Plavix Plavix           Yes  [facility]                1 tablet           [facility]



                                                                 [facility]



                                                                 [facility]

 

     Carvedilol Carvedilol           Yes  [facility]                as             

[facility]



                                                  directed           [facility]



                                                                 [facility]



                                                                 [facility]

 

     Levothyroxi Levothyroxi           Yes  [facility]                1 tablet     

      [facility]



     ne Sodium ne Sodium                                    on an           [facility]

s -



                                                  empty           [facility]



                                                  stomach in           l



                                                  the            Outpati



                                                  morning           ent



                                                                 Clinics

 

     BusPIRone BusPIRone           Yes  [facility]                1 tablet         

  [facility]



     HCl  HCl                                                    [facility]



                                                                 [facility]



                                                                 l



                                                                 Outpati



                                                                 ent



                                                                 Clinics

 

     Prilosec Prilosec           Yes  [facility]                1 capsule          

 [facility]



                                                                 [facility]



                                                                 [facility]



                                                                 l



                                                                 Outpati



                                                                 ent



                                                                 Clinics

 

     Lipitor Lipitor           Yes  [facility]                1 tablet           [facility]

[facility]



                                                                 [facility]



                                                                 [facility]



                                                                 l



                                                                 Outpati



                                                                 ent



                                                                 Clinics

 

     Hydrocodone Hydrocodone           Yes  Na [facility]                1 tablet     

      [facility]



     -Acetaminop -Acetaminop                                    as needed       

    [facility]



     hen  hen                                                    [facility]



                                                                 l



                                                                 Outpati



                                                                 ent



                                                                 Clinics

 

     Carvedilol Carvedilol           Yes  [facility]                as             

[facility]



                                                  directed           [facility]



                                                                 [facility]



                                                                 l



                                                                 Outpati



                                                                 ent



                                                                 Clinics

 

     Plavix Plavix           Yes  [facility]                1 tablet           [facility]



                                                                 [facility]



                                                                 [facility]



                                                                 l



                                                                 Outpati



                                                                 ent



                                                                 Clinics

 

     Duloxetine Duloxetine           Yes  [facility]                1 capsule      

     [facility]



     HCl  HCl                                                    [facility]



                                                                 [facility]



                                                                 l



                                                                 Outpati



                                                                 ent



                                                                 Clinics

 

     Aspirin Aspirin           Yes  [facility]                1 tablet           [facility]



                                                                 [facility]



                                                                 [facility]



                                                                 l



                                                                 Outpati



                                                                 ent



                                                                 Clinics

 

     Duloxetine Duloxetine           Yes  Na Cowan                TAKE ONE       

    [facility]



     HCl  HCl                                     (1)            [facility]



                                                  CAPSULE(S)           [facility]



                                                  BY MOUTH           l



                                                  ONCE A           Outpati



                                                  DAY.           ent



                                                                 Clinics







Procedures

This patient has no known procedures.



Encounters







        Start   End     Encounter Admission Attending Care    Care    Encounter 

Source



        Date/Time Date/Time Type    Type    Clinicians Facility Department ID   

   

 

        2020 Outpatient                 [facility]  [id number]

 [facility]



        00[facility]00 [facility]00[facility]00                                                 [facility]



                                                                        [facility]



                                                                        l



                                                                        Outpati



                                                                        ent



                                                                        Clinics

 

        2020 Outpatient                 [facility]  [id number]

 [facility]



        00[facility]00[facility]00 00[facility]00:00                                                 [facility]



                                                                        [facility]



                                                                        l



                                                                        Outpati



                                                                        ent



                                                                        Clinics

 

        2020-10-08 2020-10-08 Outpatient                 [facility]  [id number]

 [facility]



        00:00:00 00:00:00                                                 [facility]



                                                                        [facility]



                                                                        l



                                                                        Outpati



                                                                        ent



                                                                        Clinics

 

        2020 Outpatient                 [facility]  [id number]

 [facility]



        00:00:00 00:00:00                                                 [facility]



                                                                        [facility]



                                                                        l



                                                                        Outpati



                                                                        ent



                                                                        Clinics

 

        2020 Outpatient                 [facility]  [id number]

 [facility]



        00:00:00 00:00:00                                                 [facility]



                                                                        l



                                                                        Outpati



                                                                        ent



                                                                        Clinics

 

        2020 Outpatient                 [facility]

[id number] [facility]



        08:20:00 08:20:00                         [place]   [place] [employer] [facility] [employer]         [facility]

s -



                                                Drive   [facility]         l



                                                Medicine                 Outpati



                                                                        ent



                                                                        Clinics

 

        2020 Outpatient                 [facility]

[id number] [facility]



        10:18:00 10:18:00                         [place]   [place] [employer]         [facility]

s -



                                                Drive   [facility]



                                                Medicine                 Outpati



                                                                        ent



                                                                        Clinics

 

        2020 Outpatient                 [facility]

[id number] [facility]



        09:40:00 09:40:00                         [place]   [place] [facility] [place]         [facility]

s -



                                                Drive   [facility]         l



                                                Medicine                 Outpati



                                                                        ent



                                                                        Clinics

 

        2020 Outpatient                 [facility]

[id number] [facility]



        09:40:00 09:40:00                         [place]   [place]

s -



                                                Drive   [facility]



                                                Medicine                 Outpati



                                                                        ent



                                                                        Clinics

 

        2020 Outpatient                 [facility]

[id number] [facility]



        08:20:00 08:20:00                         t [place]   [place]

s -



                                                Drive   [facility]         l



                                                Medicine                 Outpati



                                                                        ent



                                                                        Clinics

 

        2019 Outpatient                 [facility]

[id number] [facility]



        15:58:00 15:58:00                         t [place]   [place]

s -



                                                Drive   [facility]         l



                                                Medicine                 Outpati



                                                                        ent



                                                                        Clinics

 

        2019 Outpatient                 [facility]

[id number] [facility]



        08:20:00 08:20:00                         [place]   [place] [employer] [place] [employer]         [facility]

s -



                                                Drive   [facility]



                                                Medicine                 Outpati



                                                                        ent



                                                                        Clinics

 

        2019 Outpatient                 [facility]

[id number] [facility]



        12:43:00 12:43:00                         t [place]   [place] [employer]         [facility]

s -



                                                Drive   [facility]         l



                                                Medicine                 Outpati



                                                                        ent



                                                                        Clinics

 

        2019 Outpatient                 [facility]

[id number] [facility]



        08:40:00 08:40:00                         t [place]   [place] [employer] [place] [employer]         [facility]

s -



                                                Drive   [facility]



                                                Medicine                 Outpati



                                                                        ent



                                                                        Clinics

 

        2019 Outpatient                 [facility]

[id number] [facility]



        08:45:00 08:45:00                         t [place]   [facility]



                                                                        ent



                                                                        [facility]

 

        2018 Outpatient                 [facility] [address]

[id number] [facility]



        09:45:00 09:45:00                         t [place]   [facility]



                                                                        ent



                                                                        Clinics







Results

This patient has no known results.

## 2020-12-02 NOTE — XMS REPORT
Summarization of Episode Note

                          Created on:2020



Patient:Dayne Wells

Sex:Male

:1965

External Reference #:[id number]





Demographics







                          Address                   [address] [place] [address]

 

                          Home Phone                [phone]

 

                          Mobile Phone              [phone]

 

                          Email Address             [email or web address]

 

                          Preferred Language        en

 

                          Marital Status            Unknown

 

                          Tenriism Affiliation     Unknown

 

                          Race                      Unknown

 

                          Ethnic Group              Unknown









Author







                          Organization              [facility]

 

                          Address                   [address]



                                                    [place] [address]

 

                          Phone                     [phone]









Support







                Name            Relationship    Address         Phone

 

                Russell            Unavailable     [phone]



                                                [place] [address] 

 

                Russell            Unavailable     Unavailable     [phone]









Care Team Providers







                    Name                Role                Phone

 

                    Mon                Unavailable         [phone]









PROBLEMS







        Type    Condition ICD9-CM BMB55-JJ Onset   Condition SNOMED Code Notes



                        Code    Code    Dates   Status          

 

        Problem Heart disease         I51.9           Active  78175515 

 

        Problem Sinus problem         J34.9           Active          

 

        Problem Hyperlipemia         E78.5           Active  27309059 

 

        Problem Depression with         F41.8           Active  03912919 



                anxiety                                         

 

        Problem Anxiety         F41.9           Active  92191822 

 

        Problem Osteoporosis         M81.0           Active  31293742 

 

        Problem Osteoarthritis of         M15.9           Active  110863857 



                multiple joints,                                         



                unspecified                                         



                osteoarthritis                                         



                type                                            

 

        Problem Hypertension         I10             Active  01936910 

 

        Problem Decreased hearing         H91.92          Active  850610272 



                of left ear                                         

 

        Problem Subclinical         E03.9           Active  29402020 



                hypothyroidism                                         

 

        Problem Moderately severe         F32.2           Active  990950888 



                depression                                         

 

        Problem Leukocytosis,         D72.829         Active  580659229 



                unspecified type                                         

 

        Problem Body temperature         R68.89          Active  218111209 



                low                                             

 

        Problem Presence of         Z95.5           Active  528075327 



                coronary                                         



                angioplasty                                         



                implant and graft                                         

 

        Problem Primary         M17.12          Active  [id number] 



                osteoarthritis of                                         



                left knee                                         

 

        Problem Seasonal allergic         J30.2           Active  870059165 



                rhinitis,                                         



                unspecified                                         



                trigger                                         

 

        Problem Gastroesophageal         K21.9           Active  108040012 



                reflux disease,                                         



                esophagitis                                         



                presence not                                         



                specified                                         

 

        Problem Atherosclerotic         I25.10          Active  721893333 



                heart disease of                                         



                native coronary                                         



                artery without                                         



                angina pectoris                                         

 

        Problem Daytime somnolence         R40.0           Active  010927340125 

 

        Problem Vitamin D         E55.9           Active  32864639 



                deficiency                                         

 

        Problem Allergic rhinitis,         J30.9           Active  37478662 



                unspecified                                         



                seasonality,                                         



                unspecified                                         



                trigger                                         

 

        Problem Other chronic pain         G89.29          Active  52237137 







ALLERGIES

No Known Allergies



ENCOUNTERS from 1965 to 2020







             Encounter    Location     Date         Provider     Diagnosis

 

             [facility] [address] 22 Sep, 2020 Neeraj Mon Pain

 in joint of 

left



             [facility] [address]                             knee M25.562

 and



             [place], [place]                            Primary osteoar

thritis



                          [address]                             of left knee M1

7.12







IMMUNIZATIONS

No Information



SOCIAL HISTORY

Tobacco Use:





                    Social History Observation Description         Date

 

                    Details (start date - stop date) Current Smoker      



Sex Assigned At Birth:





                          Social History Observation Description

 

                          Sex Assigned At Birth     Unknown



PHQ9





                    Question            Answer              Notes

 

                    Little interest or pleasure in doing things More than half t

he days 

 

                    Feeling down, depressed, or hopeless Not at all          

 

                    Trouble falling or staying asleep or sleeping too much Nearl

y every day    

 

                    Feeling tired or having little energy Nearly every day    

 

                    Poor appetite or overeating Not at all          

 

                    Feeling bad about yourself, or that you are a failure, Not a

t all          



                    or have let yourself or your family down                    

 

 

                    Trouble concentrating on things, such as reading the Not at 

all          



                    newspaper or watching television                     

 

                    Moving or speaking so slowly that other people could Not at 

all          



                    have noticed; or the opposite, being so fidgety or          

           



                    restless that you have been moving around a lot more        

             



                    than usual                              

 

                    Total Score         8                   

 

                    Interpretation      Mild Depression     

 

                    Thoughts that you would be better off dead or of Not at all 

         



                    hurting yourself in some way                     



Alcohol Screen





                    Question            Answer              Notes

 

                    Did you have a drink containing alcohol in the past Yes     

            



                    year?                                   

 

                    Points              1                   

 

                    Interpretation      Negative            

 

                    How often did you have a drink containing alcohol in Monthly

 or less (1 point) 



                    the past year?                          



Tobacco Use/Smoking





                    Question            Answer              Notes

 

                    Additional Findings: Tobacco User Moderate cigarette smoker 

(10-19 cigs/day) 

 

                    Are you a           current smoker      







REASON FOR REFERRAL

No Information



VITAL SIGNS







                    Height              64 in               22 Sep, 2020

 

                    Weight              187.6 lbs           22 Sep, 2020

 

                    Temperature         97.3 degrees Fahrenheit 22 Sep, 2020

 

                    BMI                 32.2 kg/m2          22 Sep, 2020

 

                    Blood pressure systolic 118 mm Hg           22 Sep, 2020

 

                    Blood pressure diastolic 78 mm Hg            22 Sep, 2020







MEDICATIONS







             Medication   SIG (Take, Route, Start Date   End Date     Status



                          Frequency, Duration)                           

 

             Duloxetine HCl 60 MG TAKE ONE (1) CAPSULE(S)                       

    Active



                          BY MOUTH ONCE A DAY.                           

 

             Doxycycline Hyclate                                        Not-Taki

ng

 

             Clopidogrel Bisulfate                                        Not-Ta

minal

 

             Plavix 75 MG 1 tablet Orally Once a                           Not-T

aking



                          day for 90 days                           

 

             Atorvastatin Calcium                                        Active

 

             Metformin HCl 500 MG 1 tablet with a meal                          

 Active



                          Orally twice a day for                           



                          90 days                                

 

             Lipitor 80 MG 1 tablet Orally Once a                           Not-

Taking



                          day for 90 days                           

 

             Cetirizine HCl 10 MG TAKE ONE (1) TABLET(S)                        

   Active



                          BY MOUTH ONCE A DAY.                           

 

             Carvedilol 6.25 MG as directed Orally twice                        

   Active



                          a day for 90 days                           

 

             BusPIRone HCl 15 MG 1 tablet Orally Twice a                        

   Not-Taking



                          day                                    

 

             Hydrocodone-Acetaminophen 1 tablet as needed                       

    Not-Taking



              MG    Orally every 6 hrs                           

 

             Sulfamethoxazole-Trimethopri                                       

 Not-Taking



             m                                                   

 

             Albuterol Sulfate  2 puffs Inhalation every     

          Not-Taking



             (90 Base) MCG/ACT 6 hours prn sob for 30                           



                          days                                   

 

             Flonase 50 MCG/ACT 2 spray in each nostril                         

  Not-Taking



                          Nasally Once a day for                           



                          30 day(s)                              

 

             Aspirin 81 MG 1 tablet Orally Once a                           Acti

ve



                          day for 90 days                           

 

             Lisinopril 2.5 MG TAKE ONE (1) TABLET(S)                           

Active



                          BY MOUTH ONCE A DAY.                           

 

             Levothyroxine Sodium 50 MCG TAKE ONE (1) TABLET(S)                 

          Active



                          BY MOUTH EVERY MORNING                           



                          ON AN EMPTY STOMACH.                           

 

             Amoxicillin-Pot Clavulanate                                        

Not-Taking

 

             Fluticasone Propionate                                        Not-T

aking

 

             Carvedilol 6.25 MG as directed Orally twice                        

   Unknown



                          a day for 90                           

 

             Prilosec 20 MG 1 capsule Orally Once a                           No

t-Taking



                          day                                    

 

             Duloxetine HCl 60 MG 1 capsule Orally Once a                       

    Not-Taking



                          day for 30 days                           

 

             LISINOPRIL 2.5 ONE ORAL DAILY for 90                           Acti

ve



                          days                                   







PROCEDURES

No Information



RESULTS

No Results



REASON FOR VISIT

New Pt, chronic L knee pain



MEDICAL (GENERAL) HISTORY







                    Type                Description         Date

 

                    Medical History     Hypertension        

 

                    Medical History     Hyperlipemia        

 

                    Medical History     Anxiety             

 

                    Medical History     Heart disease       

 

                    Medical History     Osteoporosis        

 

                    Medical History     Sinus problem       

 

                    Medical History     Hypertension        

 

                    Medical History     Hyperlipemia        

 

                    Medical History     Anxiety             

 

                    Medical History     Heart disease       

 

                    Medical History     Osteoporosis        

 

                    Medical History     Sinus problem       

 

                    Surgical History    Knee Surgery        

 

                    Surgical History    Heart Cath          

 

                    Surgical History    Heart Surgery-Triple Bypass 







Goals Section

No Information



Health Concerns

No Information



MEDICAL EQUIPMENT

No Information



MENTAL STATUS

No Information



FUNCTIONAL STATUS

No Information



ASSESSMENTS







                    Encounter Date      Diagnosis           Notes

 

                    22 Sep, 2020        Primary osteoarthritis of left knee (ICD

-10 - M17.12) 

 

                    22 Sep, 2020        Pain in joint of left knee (ICD-10 - M25

.562) 







PLAN OF TREATMENT

Treatment Notes





                    Assessment          Notes               Clinical Notes

 

                    Primary osteoarthritis of left knee I discussed with the pat

ient at 



                                        length his diagnosis and treatment 



                                        plan and he expressed 



                                        understanding.  We discussed both 



                                        operative and nonoperative 



                                        treatment.  He has failed 



                                        conservative treatment measures 



                                        including           



                                        corticosteroid/viscosupplementation 



                                        injections, use of NSAIDs and home 



                                        exercise program.  He reports 



                                        continued pain affecting his ADLs. 



                                         We will proceed with left total 



                                        knee arthroplasty.  I discussed 



                                        with the patient risks and benefits 



                                        associated with the procedure at 



                                        length as well as postoperative 



                                        rehabilitation and he expressed 



                                        understanding.  We will begin with 



                                        CT of the left knee per [facility] 



                                        protocol and will followup in 1 



                                        month for scheduling and 



                                        reevaluation.       



Treatment Notes





                          Test Name                 Order Date

 

                          X-RAY EXAM KNEE 1 OR 2 VIEWS ([id number]) 2020

 

                          X-RAY EXAM KNEE STANDING VIEW ([id number]) 2020



Next Appt





                                        Details

 

                                        4 Weeks Reason:

 

                                        Provider Name:Samantha Cowan 2020 09:4

5:00 AM, [address], [address], [place], [address], [phone]

 

                                        Provider Name:Samantha Cowan 2020 09:4

0:00 AM, [address], [address], [place], [address], [phone]







Insurance Providers







          Payer Name Payer     Payer     Insured Name Patient   Coverage  Covera

 End



                    Address   Phone               Relationship to Start Date Carl

e



                                                  Insured             

 

          [employer]   [address] [phone] Dayne Wells self                



                     [facility] [address]                                      



                    [address]

## 2020-12-02 NOTE — XMS REPORT
Summarization of Episode Note

                          Created on:2020



Patient:Dayne Wells

Sex:Male

:1965

External Reference #:[id number]





Demographics







                          Address                   [address] [place] [address]

 

                          Home Phone                [phone]

 

                          Mobile Phone              [phone]

 

                          Email Address             [email or web address]

 

                          Preferred Language        en

 

                          Marital Status            Unknown

 

                          Latter day Affiliation     Unknown

 

                          Race                      Unknown

 

                          Ethnic Group              Unknown









Author







                          Organization              [facility]

 

                          Address                   [address]



                                                    [place] [address]

 

                          Phone                     [phone]









Support







                Name            Relationship    Address         Phone

 

                Russell            Unavailable     [phone]



                                                [place] [address] 

 

                Russell            Unavailable     Unavailable     [phone]









Care Team Providers







                    Name                Role                Phone

 

                    Mon                Unavailable         [phone]









PROBLEMS







        Type    Condition ICD9-CM UFT70-NW Onset   Condition SNOMED Code Notes



                        Code    Code    Dates   Status          

 

        Problem Heart disease         I51.9           Active  35875729 

 

        Problem Sinus problem         J34.9           Active          

 

        Problem Hyperlipemia         E78.5           Active  30645191 

 

        Problem Depression with         F41.8           Active  52114674 



                anxiety                                         

 

        Problem Anxiety         F41.9           Active  00570398 

 

        Problem Osteoporosis         M81.0           Active  59680520 

 

        Problem Osteoarthritis of         M15.9           Active  679576506 



                multiple joints,                                         



                unspecified                                         



                osteoarthritis                                         



                type                                            

 

        Problem Hypertension         I10             Active  70907726 

 

        Problem Decreased hearing         H91.92          Active  899083271 



                of left ear                                         

 

        Problem Subclinical         E03.9           Active  54751837 



                hypothyroidism                                         

 

        Problem Moderately severe         F32.2           Active  772434537 



                depression                                         

 

        Problem Leukocytosis,         D72.829         Active  639124832 



                unspecified type                                         

 

        Problem Body temperature         R68.89          Active  572853881 



                low                                             

 

        Problem Presence of         Z95.5           Active  328800127 



                coronary                                         



                angioplasty                                         



                implant and graft                                         

 

        Problem Primary         M17.12          Active  [id number] 



                osteoarthritis of                                         



                left knee                                         

 

        Problem Seasonal allergic         J30.2           Active  970262557 



                rhinitis,                                         



                unspecified                                         



                trigger                                         

 

        Problem Gastroesophageal         K21.9           Active  153103501 



                reflux disease,                                         



                esophagitis                                         



                presence not                                         



                specified                                         

 

        Problem Atherosclerotic         I25.10          Active  871479863 



                heart disease of                                         



                native coronary                                         



                artery without                                         



                angina pectoris                                         

 

        Problem Daytime somnolence         R40.0           Active  [id number] 

 

        Problem Vitamin D         E55.9           Active  55792995 



                deficiency                                         

 

        Problem Allergic rhinitis,         J30.9           Active  59096663 



                unspecified                                         



                seasonality,                                         



                unspecified                                         



                trigger                                         

 

        Problem Other chronic pain         G89.29          Active  96566510 







ALLERGIES

No Known Allergies



ENCOUNTERS from 1965 to 2020







             Encounter    Location     Date         Provider     Diagnosis

 

             [facility] [address]  Neeraj Ghosh

t pain, right



             [facility] [address]                             M25.531 ; Pr

carolyn



             [facility], [place]                            osteoarthritis 

of left



                          [address]                             knee M17.12 ; P

ain in



                                                                 joint of left k

nee



                                                                 M25.562 and Con

tusion



                                                                 of right wrist,

 initial



                                                                 encounter S60.2

11A







IMMUNIZATIONS

No Information



SOCIAL HISTORY

Tobacco Use:





                    Social History Observation Description         Date

 

                    Details (start date - stop date) Current Smoker      



Sex Assigned At Birth:





                          Social History Observation Description

 

                          Sex Assigned At Birth     Unknown



PHQ9





                    Question            Answer              Notes

 

                    Little interest or pleasure in doing things More than half t

he days 

 

                    Feeling down, depressed, or hopeless Not at all          

 

                    Trouble falling or staying asleep or sleeping too much Nearl

y every day    

 

                    Feeling tired or having little energy Nearly every day    

 

                    Poor appetite or overeating Not at all          

 

                    Feeling bad about yourself, or that you are a failure, Not a

t all          



                    or have let yourself or your family down                    

 

 

                    Trouble concentrating on things, such as reading the Not at 

all          



                    newspaper or watching television                     

 

                    Moving or speaking so slowly that other people could Not at 

all          



                    have noticed; or the opposite, being so fidgety or          

           



                    restless that you have been moving around a lot more        

             



                    than usual                              

 

                    Total Score         8                   

 

                    Interpretation      Mild Depression     

 

                    Thoughts that you would be better off dead or of Not at all 

         



                    hurting yourself in some way                     



Alcohol Screen





                    Question            Answer              Notes

 

                    Did you have a drink containing alcohol in the past Yes     

            



                    year?                                   

 

                    Points              1                   

 

                    Interpretation      Negative            

 

                    How often did you have a drink containing alcohol in Monthly

 or less (1 point) 



                    the past year?                          



Tobacco Use/Smoking





                    Question            Answer              Notes

 

                    Additional Findings: Tobacco User Moderate cigarette smoker 

(10-19 cigs/day) 

 

                    Are you a           current smoker      







REASON FOR REFERRAL

No Information



VITAL SIGNS







                    Height              64 in               

 

                    Weight              188 lbs             

 

                    Temperature         96.6 degrees Fahrenheit 

 

                    BMI                 32.27 kg/m2         

 

                    Blood pressure systolic 126 mm Hg           

 

                    Blood pressure diastolic 82 mm Hg            







MEDICATIONS







           Medication SIG (Take, Route, Notes      Start Date End Date   Status



                      Frequency, Duration)                                  

 

           Amoxicillin-Pot                                             Not-Takin

g



           Clavulanate                                             

 

           Levothyroxine Sodium 50 TAKE ONE (1)                                 

 Active



           MCG        TABLET(S) BY MOUTH                                  



                      EVERY MORNING ON AN                                  



                      EMPTY STOMACH.                                  

 

           Fluticasone Propionate                                             No

t-Taking

 

           Prilosec 20 MG 1 capsule Orally Once                                 

 Not-Taking



                      a day                                       

 

           Lisinopril 2.5 MG TAKE ONE (1)                                  Activ

e



                      TABLET(S) BY MOUTH                                  



                      ONCE A DAY.                                  

 

           Carvedilol 6.25 MG as directed Orally                                

  Active



                      twice a day for 90                                  



                      days                                        

 

           Duloxetine HCl 60 MG TAKE ONE (1)                                  Ac

tive



                      CAPSULE(S) BY MOUTH                                  



                      ONCE A DAY.                                  

 

           Lipitor 80 MG 1 tablet Orally Once                                  N

ot-Taking



                      a day for 90 days                                  

 

           Clopidogrel Bisulfate                                             Not

-Taking

 

           Carvedilol 6.25 MG as directed Orally                                

  Unknown



                      twice a day for 90                                  

 

           Cetirizine HCl 10 MG TAKE ONE (1)                                  Ac

tive



                      TABLET(S) BY MOUTH                                  



                      ONCE A DAY.                                  

 

           Flonase 50 MCG/ACT 2 spray in each                                  N

ot-Taking



                      nostril Nasally Once                                  



                      a day for 30 day(s)                                  

 

           Aspirin 81 MG 1 tablet Orally Once                                  A

ctive



                      a day for 90 days                                  

 

           Metformin HCl 500 MG 1 tablet with a meal                            

      Active



                      Orally twice a day                                  



                      for 90 days                                  

 

           Atorvastatin Calcium                                             Acti

ve

 

           LISINOPRIL 2.5 ONE ORAL DAILY for 90                                 

 Active



                      days                                        

 

           Plavix 75 MG 1 tablet Orally Once                                  No

t-Taking



                      a day for 90 days                                  

 

           Sulfamethoxazole-Trimetho                                            

 Not-Taking



           prim                                                   

 

           Doxycycline Hyclate                                             Not-T

aking

 

           Albuterol Sulfate  2 puffs Inhalation             

           Not-Taking



           (90 Base) MCG/ACT every 6 hours prn sob                              

    



                      for 30 days                                  

 

           BusPIRone HCl 15 MG 1 tablet Orally Twice                            

      Not-Taking



                      a day                                       

 

           Hydrocodone-Acetaminophen 1 tablet as needed                         

         Not-Taking



            MG  Orally every 6 hrs                                  







PROCEDURES

No Information



RESULTS

No Results



REASON FOR VISIT

F/U NEW PROB: RIGHT WRIST PAIN- XRAY, F/U LEFT KNEE- DISCUSS TKA FOR 2020



MEDICAL (GENERAL) HISTORY







                    Type                Description         Date

 

                    Medical History     Hypertension        

 

                    Medical History     Hyperlipemia        

 

                    Medical History     Anxiety             

 

                    Medical History     Heart disease       

 

                    Medical History     Osteoporosis        

 

                    Medical History     Sinus problem       

 

                    Medical History     Hypertension        

 

                    Medical History     Hyperlipemia        

 

                    Medical History     Anxiety             

 

                    Medical History     Heart disease       

 

                    Medical History     Osteoporosis        

 

                    Medical History     Sinus problem       

 

                    Surgical History    Knee Surgery        

 

                    Surgical History    Heart Cath          

 

                    Surgical History    Heart Surgery-Triple Bypass 







Goals Section

No Information



Health Concerns

No Information



MEDICAL EQUIPMENT

No Information



MENTAL STATUS

No Information



FUNCTIONAL STATUS

No Information



ASSESSMENTS







             Encounter Date Diagnosis    Assessment Notes Treatment Notes Treatm

ent



                                                                 Clinical Notes

 

              Wrist pain, right                           



                          (ICD-10 - M25.531)                           

 

              Primary                   I discussed with the 



                          osteoarthritis of              patient at length 



                          left knee (ICD-10 -              his diagnosis and 



                          M17.12)                   treatment plan and 



                                                    he expressed 



                                                    understanding. We 



                                                    discussed both 



                                                    operative and 



                                                    nonoperative 



                                                    treatment. He has 



                                                    failed conservative 



                                                    treatment measures 



                                                    including    



                                                    corticosteroid/visco 



                                                    supplementation 



                                                    injections, use of 



                                                    NSAIDs and home 



                                                    exercise program. He 



                                                    reports continued 



                                                    pain affecting his 



                                                    ADLs. We will 



                                                    proceed with left 



                                                    total knee   



                                                    arthroplasty. I 



                                                    discussed with the 



                                                    patient risks and 



                                                    benefits associated 



                                                    with the procedure 



                                                    at length as well as 



                                                    postoperative 



                                                    rehabilitation and 



                                                    he expressed 



                                                    understanding. We 



                                                    will proceed with 



                                                    surgery in 3 weeks. 



                                                    All questions were 



                                                    answered.    

 

              Pain in joint of                           



                          left knee (ICD-10 -                           



                          M25.562)                               

 

              Contusion of right              -proceed with RICE 



                                wrist, initial                  therapy



                                        



                          encounter (ICD-10 -              -may purchase OTC 



                          S60.211A)                 brace if pain 



                                                                worsens



                                        



                                                    -work on ROM 



                                                    exercises and 



                                                    progress with 



                                                    activities as 



                                                                tolerated



                                        



                                                    -f/u as needed 







PLAN OF TREATMENT

Treatment Notes





                    Assessment          Notes               Clinical Notes

 

                    Primary osteoarthritis of left knee I discussed with the pat

ient at 



                                        length his diagnosis and treatment 



                                        plan and he expressed 



                                        understanding. We discussed both 



                                        operative and nonoperative 



                                        treatment. He has failed 



                                        conservative treatment measures 



                                        including           



                                        corticosteroid/viscosupplementation 



                                        injections, use of NSAIDs and home 



                                        exercise program. He reports 



                                        continued pain affecting his ADLs. 



                                        We will proceed with left total 



                                        knee arthroplasty. I discussed with 



                                        the patient risks and benefits 



                                        associated with the procedure at 



                                        length as well as postoperative 



                                        rehabilitation and he expressed 



                                        understanding. We will proceed with 



                                        surgery in 3 weeks.  All questions 



                                        were answered.      

 

                    Contusion of right wrist, initial -proceed with RICE therapy

-may 



                    encounter           purchase OTC brace if pain 



                                        worsens-work on ROM exercises and 



                                        progress with activities as 



                                        tolerated-f/u as needed 



Treatment Notes





                          Test Name                 Order Date

 

                          X-RAY EXAM WRIST MIN 3 VIEWS ([id number]) 2020



Next Appt





                                        Details

 

                                        2 weeks postop Reason:

 

                                        Provider Name:Samantha Cowan, 2020 08:0

0:00 AM, [address], [address], [place], [address], [phone]







Insurance Providers







          Payer Name Payer     Payer     Insured Name Patient   Coverage  Covera

ge End



                    Address   Phone               Relationship to Start Date Carl

e



                                                  Insured             

 

          [employer]  [address] [address] [phone] Dayne Wells self                



                     [facility] [address]                                      



                    [address]

## 2020-12-02 NOTE — P.BOP
Preoperative diagnosis: left knee osteoarthritis


Postoperative diagnosis: same


Primary procedure: left total knee arthroplasty


Assistant: NONE,NONE


Estimated blood loss: 20 cc


Specimen: left knee bone remnants


Findings: see dictation


Anesthesia: General


Complications: None


Implants: Biomet August Persona 10 CR femur, E tibia, 32 patella, 10 CR poly


Fluids & blood products: per anesthesia record; TT: 102 @ 300 mmHg


Transferred to: Recovery Room


Condition: Good

## 2020-12-02 NOTE — XMS REPORT
Summarization of Episode Note

                           Created on:2020



Patient:Dayne Wells

Sex:Male

:1965

External Reference #:[id number]





Demographics







                          Address                   [address] [place] [address]

 

                          Home Phone                [phone]

 

                          Mobile Phone              [phone]

 

                          Email Address             [email or web address]

 

                          Preferred Language        en

 

                          Marital Status            Unknown

 

                          Buddhist Affiliation     Unknown

 

                          Race                      Unknown

 

                          Ethnic Group              Unknown









Author







                          Organization              [facility]

 

                          Address                   [address]



                                                    [place] [address]

 

                          Phone                     [phone]









Support







                Name            Relationship    Address         Phone

 

                Russell            Unavailable     [address] [phone]



                                                [place] [address] 

 

                Russell            Unavailable     Unavailable     [phone]









Care Team Providers







                    Name                Role                Phone

 

                    Mon                Unavailable         [phone]









PROBLEMS







        Type    Condition ICD9-CM VZJ10-SS Onset   Condition SNOMED Code Notes



                        Code    Code    Dates   Status          

 

        Problem Heart disease         I51.9           Active  78174705 

 

        Problem Sinus problem         J34.9           Active          

 

        Problem Hyperlipemia         E78.5           Active  82457616 

 

        Problem Depression with         F41.8           Active  43500056 



                anxiety                                         

 

        Problem Anxiety         F41.9           Active  85500119 

 

        Problem Osteoporosis         M81.0           Active  43419625 

 

        Problem Osteoarthritis of         M15.9           Active  928374257 



                multiple joints,                                         



                unspecified                                         



                osteoarthritis                                         



                type                                            

 

        Problem Hypertension         I10             Active  02088193 

 

        Problem Decreased hearing         H91.92          Active  917620639 



                of left ear                                         

 

        Problem Subclinical         E03.9           Active  31289553 



                hypothyroidism                                         

 

        Problem Moderately severe         F32.2           Active  191274658 



                depression                                         

 

        Problem Leukocytosis,         D72.829         Active  111612170 



                unspecified type                                         

 

        Problem Body temperature         R68.89          Active  197875525 



                low                                             

 

        Problem Presence of         Z95.5           Active  094427881 



                coronary                                         



                angioplasty                                         



                implant and graft                                         

 

        Problem Primary         M17.12          Active  878716996677210 



                osteoarthritis of                                         



                left knee                                         

 

        Problem Seasonal allergic         J30.2           Active  320322871 



                rhinitis,                                         



                unspecified                                         



                trigger                                         

 

        Problem Gastroesophageal         K21.9           Active  433196827 



                reflux disease,                                         



                esophagitis                                         



                presence not                                         



                specified                                         

 

        Problem Atherosclerotic         I25.10          Active  250400455 



                heart disease of                                         



                native coronary                                         



                artery without                                         



                angina pectoris                                         

 

        Problem Daytime somnolence         R40.0           Active  [id number] 

 

        Problem Vitamin D         E55.9           Active  83536447 



                deficiency                                         

 

        Problem Allergic rhinitis,         J30.9           Active  58891572 



                unspecified                                         



                seasonality,                                         



                unspecified                                         



                trigger                                         

 

        Problem Other chronic pain         G89.29          Active  46344701 







ALLERGIES

No Known Allergies



ENCOUNTERS from 1965 to 2020-10-06







             Encounter    Location     Date         Provider     Diagnosis

 

             [facility] [address] [place] 23 Sep, 2020 Neeraj Elieser 

Hypertension 

I10



             [facility] [address]      [address]                             







IMMUNIZATIONS

No Information



SOCIAL HISTORY

Tobacco Use:





                    Social History Observation Description         Date

 

                    Details (start date - stop date) Current Smoker      



Sex Assigned At Birth:





                          Social History Observation Description

 

                          Sex Assigned At Birth     Unknown



PHQ9





                    Question            Answer              Notes

 

                    Little interest or pleasure in doing things More than half t

he days 

 

                    Feeling down, depressed, or hopeless Not at all          

 

                    Trouble falling or staying asleep or sleeping too much Nearl

y every day    

 

                    Feeling tired or having little energy Nearly every day    

 

                    Poor appetite or overeating Not at all          

 

                    Feeling bad about yourself, or that you are a failure, Not a

t all          



                    or have let yourself or your family down                    

 

 

                    Trouble concentrating on things, such as reading the Not at 

all          



                    newspaper or watching television                     

 

                    Moving or speaking so slowly that other people could Not at 

all          



                    have noticed; or the opposite, being so fidgety or          

           



                    restless that you have been moving around a lot more        

             



                    than usual                              

 

                    Total Score         8                   

 

                    Interpretation      Mild Depression     

 

                    Thoughts that you would be better off dead or of Not at all 

         



                    hurting yourself in some way                     



Alcohol Screen





                    Question            Answer              Notes

 

                    Did you have a drink containing alcohol in the past Yes     

            



                    year?                                   

 

                    Points              1                   

 

                    Interpretation      Negative            

 

                    How often did you have a drink containing alcohol in Monthly

 or less (1 point) 



                    the past year?                          



Tobacco Use/Smoking





                    Question            Answer              Notes

 

                    Additional Findings: Tobacco User Moderate cigarette smoker 

(10-19 cigs/day) 

 

                    Are you a           current smoker      







REASON FOR REFERRAL

No Information



VITAL SIGNS

No information



MEDICATIONS







             Medication   SIG (Take, Route, Start Date   End Date     Status



                          Frequency, Duration)                           

 

             Duloxetine HCl 60 MG TAKE ONE (1) CAPSULE(S)                       

    Active



                          BY MOUTH ONCE A DAY.                           

 

             Doxycycline Hyclate                                        Not-Taki

ng

 

             Clopidogrel Bisulfate                                        Not-Ta

minal

 

             Plavix 75 MG 1 tablet Orally Once a                           Not-T

aking



                          day for 90 days                           

 

             Atorvastatin Calcium                                        Active

 

             Metformin HCl 500 MG 1 tablet with a meal                          

 Active



                          Orally twice a day for                           



                          90 days                                

 

             Lipitor 80 MG 1 tablet Orally Once a                           Not-

Taking



                          day for 90 days                           

 

             Cetirizine HCl 10 MG TAKE ONE (1) TABLET(S)                        

   Active



                          BY MOUTH ONCE A DAY.                           

 

             Carvedilol 6.25 MG as directed Orally twice                        

   Active



                          a day for 90 days                           

 

             BusPIRone HCl 15 MG 1 tablet Orally Twice a                        

   Not-Taking



                          day                                    

 

             Hydrocodone-Acetaminophen 1 tablet as needed                       

    Not-Taking



              MG    Orally every 6 hrs                           

 

             Sulfamethoxazole-Trimethopri                                       

 Not-Taking



             m                                                   

 

             Albuterol Sulfate  2 puffs Inhalation every     

          Not-Taking



             (90 Base) MCG/ACT 6 hours prn sob for 30                           



                          days                                   

 

             Flonase 50 MCG/ACT 2 spray in each nostril                         

  Not-Taking



                          Nasally Once a day for                           



                          30 day(s)                              

 

             Aspirin 81 MG 1 tablet Orally Once a                           Acti

ve



                          day for 90 days                           

 

             Lisinopril 2.5 MG TAKE ONE (1) TABLET(S)                           

Active



                          BY MOUTH ONCE A DAY.                           

 

             Levothyroxine Sodium 50 MCG TAKE ONE (1) TABLET(S)                 

          Active



                          BY MOUTH EVERY MORNING                           



                          ON AN EMPTY STOMACH.                           

 

             Amoxicillin-Pot Clavulanate                                        

Not-Taking

 

             Fluticasone Propionate                                        Not-T

aking

 

             Carvedilol 6.25 MG as directed Orally twice                        

   Unknown



                          a day for 90                           

 

             Prilosec 20 MG 1 capsule Orally Once a                           No

t-Taking



                          day                                    

 

             Duloxetine HCl 60 MG 1 capsule Orally Once a                       

    Not-Taking



                          day for 30 days                           

 

             LISINOPRIL 2.5 ONE ORAL DAILY for 90                           Acti

ve



                          days                                   







PROCEDURES

No Information



RESULTS

No Results



REASON FOR VISIT

pcp clearance request



MEDICAL (GENERAL) HISTORY







                    Type                Description         Date

 

                    Medical History     Hypertension        

 

                    Medical History     Hyperlipemia        

 

                    Medical History     Anxiety             

 

                    Medical History     Heart disease       

 

                    Medical History     Osteoporosis        

 

                    Medical History     Sinus problem       

 

                    Medical History     Hypertension        

 

                    Medical History     Hyperlipemia        

 

                    Medical History     Anxiety             

 

                    Medical History     Heart disease       

 

                    Medical History     Osteoporosis        

 

                    Medical History     Sinus problem       

 

                    Surgical History    Knee Surgery        

 

                    Surgical History    Heart Cath          

 

                    Surgical History    Heart Surgery-Triple Bypass 







Goals Section

No Information



Health Concerns

No Information



MEDICAL EQUIPMENT

No Information



MENTAL STATUS

No Information



FUNCTIONAL STATUS

No Information



ASSESSMENTS







                    Encounter Date      Diagnosis           Notes

 

                    23 Sep, 2020        Hypertension (ICD-10 - I10) 







PLAN OF TREATMENT

Next Appt





                                        Details

 

                                        Provider Name:Samantha Cowan, 2020 09:4

5:00 AM, [address], [address], [place], [address], [phone]

 

                                        Provider Name:Samantha Cowan 2020 09:4

0:00 AM, [address], [address], [place], [address], [phone]







Insurance Providers







          Payer Name Payer     Payer     Insured Name Patient   Coverage  Covera

ge End



                    Address   Phone               Relationship to Start Date Carl

e



                                                  Insured             

 

          [facility] [address] [address] [phone] Dayne Wells [facility] [address]                                      



                    [id number]

## 2020-12-02 NOTE — XMS REPORT
Summarization of Episode Note

                           Created on:October 10, 2020



Patient:Dayne Wells

Sex:Male

:1965

External Reference #:[id number]





Demographics







                          Address                   [address] [place] [address]

 

                          Home Phone                [phone]

 

                          Mobile Phone              [phone]

 

                          Email Address             [email or web address]

 

                          Preferred Language        en

 

                          Marital Status            Unknown

 

                          Jewish Affiliation     Unknown

 

                          Race                      Unknown

 

                          Ethnic Group              Unknown









Author







                          Organization              [facility]

 

                          Address                   [address]



                                                    [place] [address]

 

                          Phone                     [phone]









Support







                Name            Relationship    Address         Phone

 

                Russell            Unavailable     [phone]



                                                [place] [address] 

 

                Russell            Unavailable     Unavailable     [phone]









Care Team Providers







                    Name                Role                Phone

 

                    Mon                Unavailable         [phone]









PROBLEMS







        Type    Condition ICD9-CM RBO43-GM Onset   Condition SNOMED Code Notes



                        Code    Code    Dates   Status          

 

        Problem Heart disease         I51.9           Active  63837271 

 

        Problem Sinus problem         J34.9           Active          

 

        Problem Hyperlipemia         E78.5           Active  53591110 

 

        Problem Depression with         F41.8           Active  89428051 



                anxiety                                         

 

        Problem Anxiety         F41.9           Active  39609211 

 

        Problem Osteoporosis         M81.0           Active  05255311 

 

        Problem Osteoarthritis of         M15.9           Active  416193214 



                multiple joints,                                         



                unspecified                                         



                osteoarthritis                                         



                type                                            

 

        Problem Hypertension         I10             Active  55032988 

 

        Problem Decreased hearing         H91.92          Active  049675539 



                of left ear                                         

 

        Problem Subclinical         E03.9           Active  13080062 



                hypothyroidism                                         

 

        Problem Moderately severe         F32.2           Active  061140209 



                depression                                         

 

        Problem Leukocytosis,         D72.829         Active  683683589 



                unspecified type                                         

 

        Problem Body temperature         R68.89          Active  399850631 



                low                                             

 

        Problem Presence of         Z95.5           Active  756480376 



                coronary                                         



                angioplasty                                         



                implant and graft                                         

 

        Problem Primary         M17.12          Active  [id number] 



                osteoarthritis of                                         



                left knee                                         

 

        Problem Seasonal allergic         J30.2           Active  319523927 



                rhinitis,                                         



                unspecified                                         



                trigger                                         

 

        Problem Gastroesophageal         K21.9           Active  154550757 



                reflux disease,                                         



                esophagitis                                         



                presence not                                         



                specified                                         

 

        Problem Atherosclerotic         I25.10          Active  590301458 



                heart disease of                                         



                native coronary                                         



                artery without                                         



                angina pectoris                                         

 

        Problem Daytime somnolence         R40.0           Active  142841330189 

 

        Problem Vitamin D         E55.9           Active  99937639 



                deficiency                                         

 

        Problem Allergic rhinitis,         J30.9           Active  47279053 



                unspecified                                         



                seasonality,                                         



                unspecified                                         



                trigger                                         

 

        Problem Other chronic pain         G89.29          Active  41550595 







ALLERGIES

No Known Allergies



ENCOUNTERS from 1965 to 2020-10-09







             Encounter    Location     Date         Provider     Diagnosis

 

             [facility] [address] 08 Oct, 2020 Rose oMn 



             [place], [place]                           



                          [address]                             







IMMUNIZATIONS

No Information



SOCIAL HISTORY

Tobacco Use:





                    Social History Observation Description         Date

 

                    Details (start date - stop date) Current Smoker      



Sex Assigned At Birth:





                          Social History Observation Description

 

                          Sex Assigned At Birth     Unknown



PHQ9





                    Question            Answer              Notes

 

                    Little interest or pleasure in doing things More than half t

he days 

 

                    Feeling down, depressed, or hopeless Not at all          

 

                    Trouble falling or staying asleep or sleeping too much Nearl

y every day    

 

                    Feeling tired or having little energy Nearly every day    

 

                    Poor appetite or overeating Not at all          

 

                    Feeling bad about yourself, or that you are a failure, Not a

t all          



                    or have let yourself or your family down                    

 

 

                    Trouble concentrating on things, such as reading the Not at 

all          



                    newspaper or watching television                     

 

                    Moving or speaking so slowly that other people could Not at 

all          



                    have noticed; or the opposite, being so fidgety or          

           



                    restless that you have been moving around a lot more        

             



                    than usual                              

 

                    Total Score         8                   

 

                    Interpretation      Mild Depression     

 

                    Thoughts that you would be better off dead or of Not at all 

         



                    hurting yourself in some way                     



Alcohol Screen





                    Question            Answer              Notes

 

                    Did you have a drink containing alcohol in the past Yes     

            



                    year?                                   

 

                    Points              1                   

 

                    Interpretation      Negative            

 

                    How often did you have a drink containing alcohol in Monthly

 or less (1 point) 



                    the past year?                          



Tobacco Use/Smoking





                    Question            Answer              Notes

 

                    Additional Findings: Tobacco User Moderate cigarette smoker 

(10-19 cigs/day) 

 

                    Are you a           current smoker      







REASON FOR REFERRAL

No Information



VITAL SIGNS

No information



MEDICATIONS







             Medication   SIG (Take, Route, Start Date   End Date     Status



                          Frequency, Duration)                           

 

             Duloxetine HCl 60 MG TAKE ONE (1) CAPSULE(S)                       

    Active



                          BY MOUTH ONCE A DAY.                           

 

             Doxycycline Hyclate                                        Not-Taki

ng

 

             Clopidogrel Bisulfate                                        Not-Ta

minal

 

             Plavix 75 MG 1 tablet Orally Once a                           Not-T

aking



                          day for 90 days                           

 

             Atorvastatin Calcium                                        Active

 

             Metformin HCl 500 MG 1 tablet with a meal                          

 Active



                          Orally twice a day for                           



                          90 days                                

 

             Lipitor 80 MG 1 tablet Orally Once a                           Not-

Taking



                          day for 90 days                           

 

             Cetirizine HCl 10 MG TAKE ONE (1) TABLET(S)                        

   Active



                          BY MOUTH ONCE A DAY.                           

 

             Carvedilol 6.25 MG as directed Orally twice                        

   Active



                          a day for 90 days                           

 

             BusPIRone HCl 15 MG 1 tablet Orally Twice a                        

   Not-Taking



                          day                                    

 

             Hydrocodone-Acetaminophen 1 tablet as needed                       

    Not-Taking



              MG    Orally every 6 hrs                           

 

             Sulfamethoxazole-Trimethopri                                       

 Not-Taking



             m                                                   

 

             Albuterol Sulfate  2 puffs Inhalation every     

          Not-Taking



             (90 Base) MCG/ACT 6 hours prn sob for 30                           



                          days                                   

 

             Flonase 50 MCG/ACT 2 spray in each nostril                         

  Not-Taking



                          Nasally Once a day for                           



                          30 day(s)                              

 

             Aspirin 81 MG 1 tablet Orally Once a                           Acti

ve



                          day for 90 days                           

 

             Lisinopril 2.5 MG TAKE ONE (1) TABLET(S)                           

Active



                          BY MOUTH ONCE A DAY.                           

 

             Levothyroxine Sodium 50 MCG TAKE ONE (1) TABLET(S)                 

          Active



                          BY MOUTH EVERY MORNING                           



                          ON AN EMPTY STOMACH.                           

 

             Amoxicillin-Pot Clavulanate                                        

Not-Taking

 

             Fluticasone Propionate                                        Not-T

aking

 

             Carvedilol 6.25 MG as directed Orally twice                        

   Unknown



                          a day for 90                           

 

             Prilosec 20 MG 1 capsule Orally Once a                           No

t-Taking



                          day                                    

 

             Duloxetine HCl 60 MG 1 capsule Orally Once a                       

    Not-Taking



                          day for 30 days                           

 

             LISINOPRIL 2.5 ONE ORAL DAILY for 90                           Acti

ve



                          days                                   







PROCEDURES

No Information



RESULTS

No Results



REASON FOR VISIT

MRI- TKA



MEDICAL (GENERAL) HISTORY







                    Type                Description         Date

 

                    Medical History     Hypertension        

 

                    Medical History     Hyperlipemia        

 

                    Medical History     Anxiety             

 

                    Medical History     Heart disease       

 

                    Medical History     Osteoporosis        

 

                    Medical History     Sinus problem       

 

                    Medical History     Hypertension        

 

                    Medical History     Hyperlipemia        

 

                    Medical History     Anxiety             

 

                    Medical History     Heart disease       

 

                    Medical History     Osteoporosis        

 

                    Medical History     Sinus problem       

 

                    Surgical History    Knee Surgery        

 

                    Surgical History    Heart Cath          

 

                    Surgical History    Heart Surgery-Triple Bypass 







Goals Section

No Information



Health Concerns

No Information



MEDICAL EQUIPMENT

No Information



MENTAL STATUS

No Information



FUNCTIONAL STATUS

No Information



ASSESSMENTS

No Information



PLAN OF TREATMENT

Next Appt





                                        Details

 

                                        Provider Name:Samantha Cowan 2020 09:4

5:00 AM, [address], [address], [place], [address], [phone]

 

                                        Provider Name:Samantha Cowan 2020 09:4

0:00 AM, [address], [address], [place], [address], [phone]







Insurance Providers







          Payer Name Payer     Payer     Insured Name Patient   Coverage  Covera

ge End



                    Address   Phone               Relationship to Start Date Carl

e



                                                  Insured             

 

          [employer]  [address] [address] [phone] Dayne Wells self                



                     [facility] [address]                                      



                    [address]

## 2020-12-02 NOTE — XMS REPORT
Summarization of Episode Note

                           Created on:2020



Patient:Dayne Wells

Sex:Male

:1965

External Reference #:[id number]





Demographics







                          Address                   [address] [place] [address]

 

                          Home Phone                [phone]

 

                          Mobile Phone              [phone]

 

                          Email Address             [email or web address]

 

                          Preferred Language        en

 

                          Marital Status            Unknown

 

                          Hindu Affiliation     Unknown

 

                          Race                      Unknown

 

                          Ethnic Group              Unknown









Author







                          Organization              [facility]

 

                          Address                   [address]



                                                    [place] [address]

 

                          Phone                     [phone]









Support







                Name            Relationship    Address         Phone

 

                Russell            Unavailable     [address] [phone]



                                                [place] [address] 

 

                Russell            Unavailable     Unavailable     [phone]









Care Team Providers







                    Name                Role                Phone

 

                    Mon                Unavailable         [phone]









PROBLEMS







        Type    Condition ICD9-CM ECL25-QQ Onset   Condition SNOMED Code Notes



                        Code    Code    Dates   Status          

 

        Problem Heart disease         I51.9           Active  01262174 

 

        Problem Sinus problem         J34.9           Active          

 

        Problem Hyperlipemia         E78.5           Active  62758103 

 

        Problem Depression with         F41.8           Active  60833067 



                anxiety                                         

 

        Problem Anxiety         F41.9           Active  48048715 

 

        Problem Osteoporosis         M81.0           Active  03765194 

 

        Problem Osteoarthritis of         M15.9           Active  588111565 



                multiple joints,                                         



                unspecified                                         



                osteoarthritis                                         



                type                                            

 

        Problem Hypertension         I10             Active  29853020 

 

        Problem Decreased hearing         H91.92          Active  380239707 



                of left ear                                         

 

        Problem Subclinical         E03.9           Active  61747138 



                hypothyroidism                                         

 

        Problem Moderately severe         F32.2           Active  475919221 



                depression                                         

 

        Problem Leukocytosis,         D72.829         Active  231450840 



                unspecified type                                         

 

        Problem Body temperature         R68.89          Active  421406652 



                low                                             

 

        Problem Presence of         Z95.5           Active  916651464 



                coronary                                         



                angioplasty                                         



                implant and graft                                         

 

        Problem Primary         M17.12          Active  [id number] 



                osteoarthritis of                                         



                left knee                                         

 

        Problem Seasonal allergic         J30.2           Active  787007103 



                rhinitis,                                         



                unspecified                                         



                trigger                                         

 

        Problem Gastroesophageal         K21.9           Active  649690754 



                reflux disease,                                         



                esophagitis                                         



                presence not                                         



                specified                                         

 

        Problem Atherosclerotic         I25.10          Active  678016408 



                heart disease of                                         



                native coronary                                         



                artery without                                         



                angina pectoris                                         

 

        Problem Daytime somnolence         R40.0           Active  [id number] 

 

        Problem Vitamin D         E55.9           Active  10578877 



                deficiency                                         

 

        Problem Allergic rhinitis,         J30.9           Active  89702397 



                unspecified                                         



                seasonality,                                         



                unspecified                                         



                trigger                                         

 

        Problem Other chronic pain         G89.29          Active  66334791 







ALLERGIES

No Known Allergies



ENCOUNTERS from 1965 to 2020







             Encounter    Location     Date         Provider     Diagnosis

 

             [facility] [address]  Neeraj Elieser Pain

 in joint of 

left



             [facility] [address]                             knee M25.562

 and



             [place], [place]                            Primary osteoar

thritis



                          [id number]                             of left knee M1

7.12







IMMUNIZATIONS

No Information



SOCIAL HISTORY

Tobacco Use:





                    Social History Observation Description         Date

 

                    Details (start date - stop date) Current Smoker      



Sex Assigned At Birth:





                          Social History Observation Description

 

                          Sex Assigned At Birth     Unknown



PHQ9





                    Question            Answer              Notes

 

                    Little interest or pleasure in doing things More than half t

he days 

 

                    Feeling down, depressed, or hopeless Not at all          

 

                    Trouble falling or staying asleep or sleeping too much Nearl

y every day    

 

                    Feeling tired or having little energy Nearly every day    

 

                    Poor appetite or overeating Not at all          

 

                    Feeling bad about yourself, or that you are a failure, Not a

t all          



                    or have let yourself or your family down                    

 

 

                    Trouble concentrating on things, such as reading the Not at 

all          



                    newspaper or watching television                     

 

                    Moving or speaking so slowly that other people could Not at 

all          



                    have noticed; or the opposite, being so fidgety or          

           



                    restless that you have been moving around a lot more        

             



                    than usual                              

 

                    Total Score         8                   

 

                    Interpretation      Mild Depression     

 

                    Thoughts that you would be better off dead or of Not at all 

         



                    hurting yourself in some way                     



Alcohol Screen





                    Question            Answer              Notes

 

                    Did you have a drink containing alcohol in the past Yes     

            



                    year?                                   

 

                    Points              1                   

 

                    Interpretation      Negative            

 

                    How often did you have a drink containing alcohol in Monthly

 or less (1 point) 



                    the past year?                          



Tobacco Use/Smoking





                    Question            Answer              Notes

 

                    Additional Findings: Tobacco User Moderate cigarette smoker 

(10-19 cigs/day) 

 

                    Are you a           current smoker      







REASON FOR REFERRAL

No Information



VITAL SIGNS







                    Height              64 in               

 

                    Weight              188 lbs             

 

                    Temperature         97.1 degrees Fahrenheit 

 

                    BMI                 32.27 kg/m2         

 

                    Blood pressure systolic 110 mm Hg           

 

                    Blood pressure diastolic 74 mm Hg            







MEDICATIONS







           Medication SIG (Take, Route, Notes      Start Date End Date   Status



                      Frequency, Duration)                                  

 

           Carvedilol 6.25 MG as directed Orally                                

  Active



                      twice a day for 90                                  



                      days                                        

 

           Prilosec 20 MG 1 capsule Orally Once                                 

 Not-Taking



                      a day                                       

 

           Xarelto 10 MG 1 tablet Orally Once                       

 Active



                      a day for 11 days                                  

 

           Aspirin 81 MG 1 tablet Orally Once                                  A

ctive



                      a day for 90 days                                  

 

           Fluticasone Propionate                                             No

t-Taking

 

           Flonase 50 MCG/ACT 2 spray in each                                  N

ot-Taking



                      nostril Nasally Once                                  



                      a day for 30 day(s)                                  

 

           Atorvastatin Calcium                                             Acti

ve

 

           BusPIRone HCl 15 MG 1 tablet Orally Twice                            

      Not-Taking



                      a day                                       

 

           Duloxetine HCl 60 MG TAKE ONE (1)                                  Ac

tive



                      CAPSULE(S) BY MOUTH                                  



                      ONCE A DAY.                                  

 

           Sulfamethoxazole-Trimetho                                            

 Not-Taking



           prim                                                   

 

           Clopidogrel Bisulfate                                             Not

-Taking

 

           Plavix 75 MG 1 tablet Orally Once                                  No

t-Taking



                      a day for 90 days                                  

 

           Cetirizine HCl 10 MG TAKE ONE (1)                                  Ac

tive



                      TABLET(S) BY MOUTH                                  



                      ONCE A DAY.                                  

 

           Doxycycline Hyclate                                             Not-T

aking

 

           Hydrocodone-Acetaminophen 1 tablet as needed                         

         Not-Taking



            MG  Orally every 6 hrs                                  

 

           Carvedilol 6.25 MG as directed Orally                                

  Unknown



                      twice a day for 90                                  

 

           Amoxicillin-Pot                                             Not-Takin

g



           Clavulanate                                             

 

           Metformin HCl 500 MG 1 tablet with a meal                            

      Active



                      Orally twice a day                                  



                      for 90 days                                  

 

           Albuterol Sulfate  2 puffs Inhalation             

           Not-Taking



           (90 Base) MCG/ACT every 6 hours prn sob                              

    



                      for 30 days                                  

 

           Norco 7.5-325 MG 1 PO Q Orally 4-6 HRS                   

     Active



                      PRN PAIN                                    

 

           Lipitor 80 MG 1 tablet Orally Once                                  N

ot-Taking



                      a day for 90 days                                  

 

           Levothyroxine Sodium 50 TAKE ONE (1)                                 

 Active



           MCG        TABLET(S) BY MOUTH                                  



                      EVERY MORNING ON AN                                  



                      EMPTY STOMACH.                                  

 

           LISINOPRIL 2.5 ONE ORAL DAILY for 90                                 

 Active



                      days                                        







PROCEDURES

No Information



RESULTS

No Results



REASON FOR VISIT

DISCUSS LT TKA FOR 2020



MEDICAL (GENERAL) HISTORY







                    Type                Description         Date

 

                    Medical History     Hypertension        

 

                    Medical History     Hyperlipemia        

 

                    Medical History     Anxiety             

 

                    Medical History     Heart disease       

 

                    Medical History     Osteoporosis        

 

                    Medical History     Sinus problem       

 

                    Medical History     Hypertension        

 

                    Medical History     Hyperlipemia        

 

                    Medical History     Anxiety             

 

                    Medical History     Heart disease       

 

                    Medical History     Osteoporosis        

 

                    Medical History     Sinus problem       

 

                    Surgical History    Knee Surgery        

 

                    Surgical History    Heart Cath          

 

                    Surgical History    Heart Surgery-Triple Bypass 







Goals Section

No Information



Health Concerns

No Information



MEDICAL EQUIPMENT

No Information



MENTAL STATUS

No Information



FUNCTIONAL STATUS

No Information



ASSESSMENTS







             Encounter Date Diagnosis    Assessment Notes Treatment Notes Treatm

ent



                                                                 Clinical Notes

 

              Pain in joint of                           



                          left knee (ICD-10 -                           



                          M25.562)                               

 

              Primary                   We will proceed with 



                          osteoarthritis of              left total knee 



                          left knee (ICD-10 -              arthroplasty. I 



                          M17.12)                   discussed with the 



                                                    patient risks and 



                                                    benefits associated 



                                                    with the procedure 



                                                    at length as well as 



                                                    postoperative 



                                                    rehabilitation and 



                                                    he expressed 



                                                    understanding.  I 



                                                    discussed with the 



                                                    patient at length 



                                                    his diagnosis and 



                                                    treatment plan and 



                                                    he expressed 



                                                    understanding. We 



                                                    discussed both 



                                                    operative and 



                                                    nonoperative 



                                                    treatment. He has 



                                                    failed conservative 



                                                    treatment measures 



                                                    including    



                                                    corticosteroid/visco 



                                                    supplementation 



                                                    injections, use of 



                                                    NSAIDs and home 



                                                    exercise program. He 



                                                    reports continued 



                                                    pain affecting his 



                                                    ADLs. We will 



                                                    proceed with left 



                                                    total knee   



                                                    arthroplasty. I 



                                                    discussed with the 



                                                    patient risks and 



                                                    benefits associated 



                                                    with the procedure 



                                                    at length as well as 



                                                    postoperative 



                                                    rehabilitation and 



                                                    he expressed 



                                                    understanding. We 



                                                    will proceed with 



                                                    surgery next week. 



                                                    All questions were 



                                                    answered.    







PLAN OF TREATMENT

Medication





                Medication Name Sig             Start Date      Stop Date

 

                Xarelto 10 MG   1 tablet Orally Once a day for 11 days     

 

                Norco 7.5-325 MG 1 PO Q Orally 4-6 HRS PRN PAIN     



Treatment Notes





                    Assessment          Notes               Clinical Notes

 

                    Primary osteoarthritis of left knee We will proceed with lef

t total 



                                        knee arthroplasty. I discussed with 



                                        the patient risks and benefits 



                                        associated with the procedure at 



                                        length as well as postoperative 



                                        rehabilitation and he expressed 



                                        understanding.  I discussed with 



                                        the patient at length his diagnosis 



                                        and treatment plan and he expressed 



                                        understanding. We discussed both 



                                        operative and nonoperative 



                                        treatment. He has failed 



                                        conservative treatment measures 



                                        including           



                                        corticosteroid/viscosupplementation 



                                        injections, use of NSAIDs and home 



                                        exercise program. He reports 



                                        continued pain affecting his ADLs. 



                                        We will proceed with left total 



                                        knee arthroplasty. I discussed with 



                                        the patient risks and benefits 



                                        associated with the procedure at 



                                        length as well as postoperative 



                                        rehabilitation and he expressed 



                                        understanding. We will proceed with 



                                        surgery next week.  All questions 



                                        were answered.      



Next Appt





                                        Details

 

                                        2 Weeks Reason:for 1st post op

 

                                        Provider Name:Neeraj Mon, 2020 0

1:00:00 PM, [address], [address], 

[place], [address], [phone]

 

                                        Provider Name:Samantha Cowan, 2020 08:0

0:00 AM, [address], [address], [place], [address], [phone]



Follow Up:2 Weeksfor 1st post op



Insurance Providers







          Payer Name Payer     Payer     Insured Name Patient   Coverage  Covera

ge End



                    Address   Phone               Relationship to Start Date Carl

e



                                                  Insured             

 

          [employer]  [address] [address] [phone] Dayne Wells self                



                     [facility] [address]                                      



                    [address]

## 2020-12-03 VITALS — TEMPERATURE: 98.5 F

## 2020-12-03 VITALS — DIASTOLIC BLOOD PRESSURE: 77 MMHG | SYSTOLIC BLOOD PRESSURE: 138 MMHG

## 2020-12-03 LAB
BUN BLD-MCNC: 12 MG/DL (ref 7–18)
GLUCOSE SERPLBLD-MCNC: 151 MG/DL (ref 74–106)
HCT VFR BLD CALC: 35.6 % (ref 39.6–49)
LYMPHOCYTES # SPEC AUTO: 1 K/UL (ref 0.7–4.9)
MORPHOLOGY BLD-IMP: (no result)
PMV BLD: 9.2 FL (ref 7.6–11.3)
POTASSIUM SERPL-SCNC: 4 MMOL/L (ref 3.5–5.1)
RBC # BLD: 4.01 M/UL (ref 4.33–5.43)

## 2020-12-03 RX ADMIN — HYDROCODONE BITARTRATE AND ACETAMINOPHEN PRN TAB: 7.5; 325 TABLET ORAL at 02:46

## 2020-12-03 RX ADMIN — CEFAZOLIN SCH MLS: 1 INJECTION, POWDER, FOR SOLUTION INTRAMUSCULAR; INTRAVENOUS; PARENTERAL at 08:28

## 2020-12-03 RX ADMIN — HYDROCODONE BITARTRATE AND ACETAMINOPHEN PRN TAB: 7.5; 325 TABLET ORAL at 08:41

## 2020-12-03 RX ADMIN — ENOXAPARIN SODIUM SCH MG: 30 INJECTION SUBCUTANEOUS at 05:42

## 2020-12-03 RX ADMIN — METFORMIN HYDROCHLORIDE SCH MG: 500 TABLET, FILM COATED ORAL at 08:32

## 2020-12-03 RX ADMIN — CEFAZOLIN SCH MLS: 1 INJECTION, POWDER, FOR SOLUTION INTRAMUSCULAR; INTRAVENOUS; PARENTERAL at 01:00

## 2020-12-03 RX ADMIN — ENOXAPARIN SODIUM SCH: 30 INJECTION SUBCUTANEOUS at 08:28

## 2020-12-03 NOTE — P.OP
Preoperative diagnosis: left knee osteoarthritis


Postoperative diagnosis: same


Primary procedure: left total knee arthroplasty


Secondary procedure: none


Anesthesia: general


Estimated blood loss: 20 cc


Specimen: left knee bone remnants


Findings: see dictation


Operative Technique: 





Indication For Procedure:  Dayne is a 55 year-old male presenting to my clinic 

with signs, symptoms and x-ray findings consistent with a severe left knee 

osteoarthritis.  I discussed with the patient at length risks and benefits 

associated with operative and nonoperative treatment.  He had failed 

conservative treatment measures and had significant difficulties with ADLs 

secondary to his pain.  We discussed operative treatment and elected to proceed 

with left total knee arthroplasty.  He expressed understanding and elected to 

proceed with operative treatment.





Description Of Procedure:  After informed consent was obtained, the patient was 

identified in the preoperative holding area.  The left lower extremity was 

marked.  The patient was then taken to the PACU where he underwent a left lower 

extremity adductor canal block performed by Anesthesia.  He was then taken to 

the operating room, transferred to the operating table in supine fashion, and 

placed under general anesthesia.  His left lower extremity was then prepped and 

draped in usual sterile fashion.  A time-out was initiated.  The correct patient

and procedure were confirmed and identified.  The patient did receive his 

preoperative prophylactic antibiotics.  The left lower extremity was then exsa

nguinated and tourniquet was inflated to 300 mmHg.  Approximately 15 cm 

longitudinal incision was made centered over the anterior aspect of the left 

knee.  Dissection was then taken to the extensor mechanism and a medial 

parapatellar arthrotomy was performed.  The patella was everted and dislocated 

laterally and the knee was flexed in the fat pad.  Medial lateral meniscus was 

all excised exposing the distal femur.  Patient was noted to have an ACL 

deficient knee and evidence of prior reconstruction.  Excess hypertrophic 

synovium was also excised within the suprapatellar pouch.  The patient had an 

MRI of his left knee preoperatively for surgical planning and creation of 

cutting blocks.  The  cutting block was then placed over the distal femur and 

pins were then placed.  The distal femoral cutting block was then placed over 

the pins.  Knee joint was then used to ensure proper depth cut and the distal 

femur was then cut.  The chamfer cutting guide was then placed over the distal 

end of the femur.  Anterior, posterior cuts as well as anterior and posterior 

chamfer cuts were then made again confirming proper depth of the cut using an 

[place]man wing.  Excess bone remnants were then sent to pathology for further 

evaluation.  Next, attention was taken to the proximal tibia.  A tibial jig and 

tibial cutting block was then placed on proximal aspect of the right tibia and 

locked into position.  Pins were then placed and alignment guide was then used 

to confirm proper alignment of the cut and then coronal and sagittal planes.  

Once this was confirmed, the cutting jig was placed over the pins and the 

proximal tibia was cut.    Sizing trays were then selected and size 10 mm spacer

was used and there was good overall balance in flexion and extension.  Next, the

trial implants were then placed using the size 10 standard CR femur and a size E

tibia with a 10 mm poly.  There was overall good range of motion and good 

stability Trial implants were then removed.  The wound was then irrigated 

thoroughly with normal saline and the knee was then injected with 30 cc of 0.5% 

Marcaine with epinephrine both in the posterior capsule and mediallateral 

gutters as well as quadriceps tendon and periosteum.  The tibia was then 

punched.  The femur was drilled.  The cement was then prepared on the back 

table.  Cement was then placed first on the tibial surface followed by size E 

tibia.  Excess cement was removed with [place] elevators.  Size 10 standard CR 

femur was then placed on the distal femur after cement was placed on the distal 

femur.  Excess cement was then removed and a size 10 mm trial poly was then 

placed.  The knee was held in extension as the cement hardened.  Undersurface of

the patella was prepared debriding osteophytes using rongeurs as well as 

osteophytes had been debrided off the proximal tibia with rongeurs and 

osteotomes to aid with the medial tightness.  Cement was placed on the 

undersurface of the patella after it was cut and a size 32 patella was placed.  

Once the cement was hardened, the knee was ranged, there was good overall 

stability both in flexion, extension and as well as stability with varus and 

valgus stresses.  Trial poly was then removed and a size 10 mm CR poly was then 

placed and locked into position.  The knee was then ranged again.  There was 

good overall range of motion both for flexion and extension with good stability.

The wound was then irrigated again thoroughly with normal saline using pulse 

lavage.  Tourniquet was let down.  Hemostasis was achieved using Bovie 

electrocautery.  Extensor mechanism was then approximated using a #1 Vicryl 

bothin interrupted and running fashion.  The fascia was then approximated using 

0 Vicryl.  Subcutaneous tissue was approximated with a 2-0 Vicryl.  Skin was 

approximated using staples.  Sterile dressings were applied.  The patient was 

awakened and transferred back in stable condition


Complications: None


Implants: Biomet August Persona 10 STD CR femur, E tibia, 32 patella, 10 mm CR 

poly


Fluids & blood products: per anesthesia record


Transferred to: Recovery Room


Condition: Good

## 2020-12-03 NOTE — P.DS
Admission Date: 12/02/20


Discharge Date: 12/03/20


Disposition: DC HOME/HOME HEALTH CARE


Discharge Condition: GOOD


Reason for Admission: s/p L TKA


Consultations: 





none


Procedures: 





L TKA on 12/2/2020


Brief History of Present Illness: 





Dayne underwent left TKA on 12/2/2020 and was admitted to the floor for 

observation


Hospital Course: 





Underwent L TKA on 12/2/2020 without complication.  Admitted to the floor and 

mobilized well with physical therapy.  His pain was controlled and vital signs 

remained stable.  Discharged on 12/3/2020 in stable condition.


Vital Signs/Physical Exam: 














Temp Pulse Resp BP Pulse Ox


 


 98.5 F   74   18   138/77   94 


 


 12/03/20 08:00  12/03/20 08:31  12/03/20 09:41  12/03/20 08:31  12/03/20 09:41








Laboratory Data at Discharge: 














WBC  23.2 K/uL (4.3-10.9)  H* D 12/03/20  05:18    


 


Hgb  11.8 g/dL (13.6-17.9)  L  12/03/20  05:18    


 


Hct  35.6 % (39.6-49.0)  L  12/03/20  05:18    


 


Plt Count  277 K/uL (152-406)   12/03/20  05:18    


 


PT  10.9 SECONDS (9.5-12.5)   11/25/20  08:07    


 


INR  0.92   11/25/20  08:07    


 


APTT  30.5 SECONDS (24.3-36.9)   11/25/20  08:07    


 


Sodium  140 mmol/L (136-145)   12/03/20  05:18    


 


Potassium  4.0 mmol/L (3.5-5.1)   12/03/20  05:18    


 


BUN  12 mg/dL (7-18)   12/03/20  05:18    


 


Creatinine  0.76 mg/dL (0.55-1.3)   12/03/20  05:18    


 


Glucose  151 mg/dL ()  H  12/03/20  05:18    








Home Medications: 








Atorvastatin Calcium [Lipitor] 80 mg PO DAILY 08/16/18 


carvediloL [Carvedilol] 6.25 mg PO BID 08/16/18 


Cetirizine HCl 10 mg PO DAILY 11/25/20 


Duloxetine [Cymbalta *] 60 mg PO DAILY 11/25/20 


Lisinopril [Zestril] 2.5 mg PO DAILY 11/25/20 


Metformin HCl [Glucophage*] 500 mg PO BIDWM 11/25/20 


Multivitamin [Multivitamins] 1 each PO DAILY 11/25/20 


Hydrocodone 7.5/APAP 325 [Norco 7.5/325 mg*] 1 tab PO Q4H PRN  tab 12/03/20 





Patient Discharge Instructions: keep dressing clean and dry; start Xarelto 

tomorrow 12/4/2020 in the morning and take once daily; begin working with HHPT


Diet: ADA


Activity: Weight bearing as tolerated


Followup: 


Samantha Cowan DO [Primary Care Provider] - 


Neeraj Mon MD [ACTIVE - CAN ADMIT] -

## 2021-01-21 LAB
BUN BLD-MCNC: 11 MG/DL (ref 7–18)
GLUCOSE SERPLBLD-MCNC: 80 MG/DL (ref 74–106)
HCT VFR BLD CALC: 38.8 % (ref 39.6–49)
INR BLD: 1.01
LYMPHOCYTES # SPEC AUTO: 2.7 K/UL (ref 0.7–4.9)
PMV BLD: 8.6 FL (ref 7.6–11.3)
POTASSIUM SERPL-SCNC: 4 MMOL/L (ref 3.5–5.1)
RBC # BLD: 4.42 M/UL (ref 4.33–5.43)

## 2021-01-27 ENCOUNTER — HOSPITAL ENCOUNTER (OUTPATIENT)
Dept: HOSPITAL 97 - OR | Age: 56
Discharge: HOME | End: 2021-01-27
Attending: ORTHOPAEDIC SURGERY
Payer: COMMERCIAL

## 2021-01-27 VITALS — DIASTOLIC BLOOD PRESSURE: 76 MMHG | OXYGEN SATURATION: 97 % | SYSTOLIC BLOOD PRESSURE: 131 MMHG

## 2021-01-27 VITALS — TEMPERATURE: 96.9 F

## 2021-01-27 DIAGNOSIS — Z96.652: ICD-10-CM

## 2021-01-27 DIAGNOSIS — Z47.1: ICD-10-CM

## 2021-01-27 DIAGNOSIS — M17.12: Primary | ICD-10-CM

## 2021-01-27 DIAGNOSIS — M24.562: ICD-10-CM

## 2021-01-27 DIAGNOSIS — Z20.822: ICD-10-CM

## 2021-01-27 PROCEDURE — 85025 COMPLETE CBC W/AUTO DIFF WBC: CPT

## 2021-01-27 PROCEDURE — 73560 X-RAY EXAM OF KNEE 1 OR 2: CPT

## 2021-01-27 PROCEDURE — 36415 COLL VENOUS BLD VENIPUNCTURE: CPT

## 2021-01-27 PROCEDURE — 82947 ASSAY GLUCOSE BLOOD QUANT: CPT

## 2021-01-27 PROCEDURE — 85610 PROTHROMBIN TIME: CPT

## 2021-01-27 PROCEDURE — 0SNDXZZ RELEASE LEFT KNEE JOINT, EXTERNAL APPROACH: ICD-10-PCS

## 2021-01-27 PROCEDURE — 85730 THROMBOPLASTIN TIME PARTIAL: CPT

## 2021-01-27 PROCEDURE — 80048 BASIC METABOLIC PNL TOTAL CA: CPT

## 2021-01-27 PROCEDURE — 27570 FIXATION OF KNEE JOINT: CPT

## 2021-01-27 NOTE — XMS REPORT
Continuity of Care Document

                           Created on:2021



Patient:GREYSON KWAN

Sex:Male

:1965

External Reference #:[id number]





Demographics







                          Address                   [address] [place] [address]

 

                          Home Phone                [phone]

 

                          Work Phone                [phone]

 

                          Mobile Phone              [phone]

 

                          Email Address             [email or web address]

 

                          Preferred Language        English

 

                          Marital Status            Unknown

 

                          Hindu Affiliation     Unknown

 

                          Race                      Unknown

 

                          Additional Race(s)        Unavailable

 

                          Ethnic Group              Unknown









Author







                          Organization              [facility]

t

 

                          Address                   [address]



                                                    [place] [address]

 

                          Phone                     [phone]









Support







                Name            Relationship    Address         Phone

 

                Russell            Unavailable     [address] [facility] [phone]



                                                [place] [address] 

 

                Russell            Unavailable     Unavailable     [phone]









Care Team Providers







                    Name                Role                Phone

 

                    Unavailable         Unavailable         Unavailable









Problems

This patient has no known problems.



Allergies, Adverse Reactions, Alerts

This patient has no known allergies or adverse reactions.



Medications







       Ordered Filled Start  Stop   Current Ordering Indication Dosage Frequency

 Signature

                    Comments            Components          Source



     Medication Medication Date Date Medication? Clinician                (SIG) 

          



     Name Name                                                   

 

     Metformin Metformin       Yes  [facility]                1 tablet       

    [facility]



     HCl  HCl  5-12                               with a           [facility]



               00[facility]                               [facility]           [facility]



               00                                                l



                                                                 Outpati



                                                                 ent



                                                                 Clinics

 

     Flonase Flonase 0      Yes  [facility]                2 spray in         

  [facility]



               2-12                               each           [facility]



               00[facility]:                               nostril           [facility]



               00                                                l



                                                                 Outpati



                                                                 ent



                                                                 Clinics

 

     Cetirizine Cetirizine  2020- No   [facility]                1 tablet    

       [facility]



     HCl  HCl  2-12 08-10                                         [facility]



               00[facility]00                                         [facility]



               00   :00                                          l



                                                                 Outpati



                                                                 ent



                                                                 Clinics

 

     Albuterol Albuterol 0      Yes  [facility]                2 puffs        

   [facility]



     Sulfate HFA Sulfate HFA 1-29                                              L

[facility]



               00:00:                                              [facility]



               00                                                l



                                                                 Outpati



                                                                 ent



                                                                 Clinics

 

     LISINOPRIL LISINOPRIL 2018 2020- No   [facility]                ONE         

   [facility]



               1-05 05-04                                         [facility]



               00[facility]                                         [facility]



               00   :00                                          l



                                                                 Outpati



                                                                 ent



                                                                 Clinics

 

     Plavix Plavix           Yes  [facility]                1 tablet           [facility]



                                                                 [facility]



                                                                 [facility]



                                                                 l



                                                                 Outpati



                                                                 ent



                                                                 Clinics

 

     Carvedilol Carvedilol           Yes  [facility]                as             

[facility]



                                                  directed           [facility]



                                                                 [facility]



                                                                 l



                                                                 Outpati



                                                                 ent



                                                                 Clinics

 

     Levothyroxi Levothyroxi           Yes  [facility]                1 tablet     

      [facility]



     ne Sodium ne Sodium                                    on an           [facility]

s -



                                                  empty           [facility]



                                                  stomach in           l



                                                  the            Outpati



                                                  morning           ent



                                                                 Clinics

 

     BusPIRone BusPIRone           Yes  [facility]                1 tablet         

  [facility]



     HCl  HCl                                                    [facility]



                                                                 [facility]



                                                                 l



                                                                 Outpati



                                                                 ent



                                                                 Clinics

 

     Prilosec Prilosec           Yes  [facility]                1 capsule          

 [facility]



                                                                 [facility]



                                                                 [facility]



                                                                 l



                                                                 Outpati



                                                                 ent



                                                                 Clinics

 

     Lipitor Lipitor           Yes  [facility]                1 tablet           [facility]



                                                                 [facility]



                                                                 [facility]



                                                                 l



                                                                 Outpati



                                                                 ent



                                                                 Clinics

 

     Hydrocodone Hydrocodone           Yes  [facility]                1 tablet     

      [facility]



     -Acetaminop -Acetaminop                                    as needed       

    [facility]



     hen  hen                                                    [facility]



                                                                 l



                                                                 Outpati



                                                                 ent



                                                                 Clinics

 

     Carvedilol Carvedilol           Yes  [facility]                as             

[facility]



                                                  directed           [facility]



                                                                 [facility]



                                                                 l



                                                                 Outpati



                                                                 ent



                                                                 Clinics

 

     Plavix Plavix           Yes  [facility]                1 tablet           [facility]



                                                                 [facility]



                                                                 [facility]



                                                                 l



                                                                 Outpati



                                                                 ent



                                                                 Clinics

 

     Duloxetine Duloxetine           Yes  [facility]                1 capsule      

     [facility]



     HCl  HCl                                                    [facility]



                                                                 [facility]



                                                                 l



                                                                 Outpati



                                                                 ent



                                                                 Clinics

 

     Aspirin Aspirin           Yes  [facility]                1 tablet           [facility]



                                                                 [facility]



                                                                 [facility]



                                                                 l



                                                                 Outpati



                                                                 ent



                                                                 Clinics

 

     Duloxetine Duloxetine           Yes  [facility]                TAKE ONE       

    [facility]



     HCl  HCl                                     (1)            [facility]



                                                  CAPSULE(S)           [facility]



                                                  BY MOUTH           l



                                                  ONCE A           Outpati



                                                  DAY.           ent



                                                                 Clinics







Procedures

This patient has no known procedures.



Encounters







        Start   End     Encounter Admission Attending Care    Care    Encounter 

Source



        Date/Time Date/Time Type    Type    Clinicians Facility Department ID   

   

 

        2021 Outpatient                 [facility]  [id number]

 [facility]



        00[facility]00[facility]00 00[facility]00[facility]00                                                 [facility]



                                                                        [facility]



                                                                        [facility]



                                                                        Outpati



                                                                        ent



                                                                        Clinics

 

        2021 Outpatient                 [facility]  [id number]

 [facility]



        00[facility]00[facility]00 [facility]00:00                                                 [facility]



                                                                        [facility]



                                                                        l



                                                                        Outpati



                                                                        ent



                                                                        Clinics

 

        2021 Outpatient                 [facility]  [id number]

 [facility]



        00:00[facility]00 00[facility]00:00                                                 [facility]



                                                                        [facility]



                                                                        l



                                                                        Outpati



                                                                        ent



                                                                        Clinics

 

        2021 Outpatient                 [facility]  [id number]

 [facility]



        00:00[facility]00 00:00:00                                                 [facility]



                                                                        [facility]



                                                                        l



                                                                        Outpati



                                                                        ent



                                                                        Clinics

 

        2020 Outpatient                 [facility]  [id number]

 [facility]



        00:00[facility]00 00[facility]00:00                                                 [facility]



                                                                        [facility]



                                                                        l



                                                                        Outpati



                                                                        ent



                                                                        Clinics

 

        2020 Outpatient                 [facility]  [facility]  [id number]

 [facility]



        00:00[facility]00 00[facility]00:00                                                 [facility]



                                                                        [facility]



                                                                        l



                                                                        Outpati



                                                                        ent



                                                                        Clinics

 

        2020 Outpatient                 [facility]  [facility]  [id number]

 [facility]



        00:00[facility]00 00[facility]00:00                                                 [facility]



                                                                        [facility]



                                                                        l



                                                                        Outpati



                                                                        ent



                                                                        Clinics

 

        2020 Outpatient                 [facility]  [facility]  [id number]

 [facility]



        00:00[facility]00 00[facility]00:00                                                 [facility]



                                                                        [facility]



                                                                        l



                                                                        Outpati



                                                                        ent



                                                                        Clinics

 

        2020 Outpatient                 [facility]  [facility]  [id number]

 [facility]



        00:00[facility]00 00[facility]00:00                                                 [facility]



                                                                        [facility]



                                                                        l



                                                                        Outpati



                                                                        ent



                                                                        Clinics

 

        2020-10-08 2020-10-08 Outpatient                 [facility]  [facility]  [id number]

 [facility]



        00:00[facility]00 00[facility]00:00                                                 [facility]



                                                                        [facility]



                                                                        l



                                                                        Outpati



                                                                        ent



                                                                        Clinics

 

        2020 Outpatient                 [facility]  [facility]  [id number]

 [facility]



        00:00[facility]00 00[facility]00:00                                                 [facility]



                                                                        [facility]



                                                                        l



                                                                        Outpati



                                                                        ent



                                                                        Clinics

 

        2020 Outpatient                 [facility]  [facility]  [id number]

 [facility]



        00:00:00 00[facility]00:00                                                 [facility]



                                                                        [facility]



                                                                        l



                                                                        Outpati



                                                                        ent



                                                                        Clinics

 

        2020 Outpatient                 [facility]

[id number] [facility]



        08:20:00 08:20:00                         [place]   [address]         [facility]

s -



                                                Drive   [facility]



                                                Family  Medicine         l



                                                Medicine                 Outpati



                                                                        ent



                                                                        Clinics

 

        2020 Outpatient                 [facility] [address]

[id number] [facility]



        10:18:00 10:18:00                         [place]   [address]         [facility]

s -



                                                Drive   [facility]



                                                Family  Medicine         l



                                                Medicine                 Outpati



                                                                        ent



                                                                        Clinics

 

        2020 Outpatient                 [facility] [address]

[id number] [facility]



        09:40:00 09:40:00                         [place]   [address]         [facility]

s -



                                                Drive   [facility]



                                                Family  Medicine         l



                                                Medicine                 Outpati



                                                                        ent



                                                                        Clinics

 

        2020 Outpatient                 [facility] [address]

[id number] [facility]



        09:40:00 09:40:00                         [place]   [facility] [employer]

s -



                                                [employer]   [facility]



                                                Medicine                 Outpati



                                                                        ent



                                                                        Clinics

 

        2020 Outpatient                 [facility]

[id number] [facility]



        08:20:00 08:20:00                         [place]   [facility] [employer]

s -



                                                Drive   [facility]



                                                Medicine                 Outpati



                                                                        ent



                                                                        Clinics

 

        2019 Outpatient                 [facility] [address]

[id number] [facility]



        15:58:00 15:58:00                         [place]   [facility] [employer]

s -



                                                Drive   [facility]



                                                Medicine                 Outpati



                                                                        ent



                                                                        Clinics

 

        2019 Outpatient                 [facility] [address]

[id number] [facility]



        08:20:00 08:20:00                         [place]   [facility] [employer]

s -



                                                [employer]   [facility]



                                                Medicine                 Outpati



                                                                        ent



                                                                        Clinics

 

        2019 Outpatient                 [facility] [address]

[id number] [facility]



        12:43:00 12:43:00                         [place]   [facility] [employer]

s -



                                                [employer]   [facility]



                                                Medicine                 Outpati



                                                                        ent



                                                                        Clinics

 

        2019 Outpatient                 [facility] [address]

[id number] [facility]



        08:40:00 08:40:00                         [place]   [facility] [employer]

s -



                                                [employer]   [facility]



                                                Medicine                 Outpati



                                                                        ent



                                                                        Clinics

 

        2019 Outpatient                 [facility] [address]

[id number] [facility]



        08:45:00 08:45:00                         [place]   [employer]

s -



                                                [employer]   [facility]



                                                Medicine                 Outpati



                                                                        ent



                                                                        Clinics

 

        2018 Outpatient                 [facility]

[id number] [facility]



        09:45:00 09:45:00                         [place]   [employer]

s -



                                                [employer]   [facility]



                                                Medicine                 Outpati



                                                                        ent



                                                                        Clinics







Results

This patient has no known results.

## 2021-01-27 NOTE — RAD REPORT
EXAM DESCRIPTION:  RAD - Knee Left 2 View - 1/27/2021 8:23 am

 

CLINICAL HISTORY:  s/p manipulation of Left total knee under anesthes

 

COMPARISON:  Knee Left 3 View dated 1/15/2021; Knee Left 2 View dated 12/2/2020

 

FINDINGS:  Left total knee prosthesis in place. No radiographic evidence for loosening. No fracture o
r acute finding of the native bone. Dense arterial tree calcifications are present.No large joint eff
usion identifiable. No suspicious soft tissue finding.

 

 

IMPRESSION:  No acute bone, joint or implant finding.

## 2021-01-27 NOTE — XMS REPORT
Summarization of Episode Note

                           Created on:2021



Patient:Dayne Wells

Sex:Male

:1965

External Reference #:[id number]





Demographics







                          Address                   [address] [place] [address]

 

                          Home Phone                [phone]

 

                          Mobile Phone              [phone]

 

                          Email Address             [email or web address]

 

                          Preferred Language        en

 

                          Marital Status            Unknown

 

                          Jain Affiliation     Unknown

 

                          Race                      Unknown

 

                          Ethnic Group              Unknown









Author







                          Organization              [facility]

 

                          Address                   [address]



                                                    [place] [address]

 

                          Phone                     [phone]









Support







                Name            Relationship    Address         Phone

 

                Russell            Unavailable     [address] [phone]



                                                [place] [address] 

 

                Russell            Unavailable     Unavailable     [phone]









Care Team Providers







                    Name                Role                Phone

 

                    Mon                Unavailable         [phone]









PROBLEMS







        Type    Condition ICD9-CM XXF30-JW Onset   Condition SNOMED Code Notes



                        Code    Code    Dates   Status          

 

        Problem Gastroesophageal         K21.9           Active  140149076 



                reflux disease,                                         



                esophagitis                                         



                presence not                                         



                specified                                         

 

        Problem Osteoarthritis of         M15.9           Active  800693422 



                multiple joints,                                         



                unspecified                                         



                osteoarthritis                                         



                type                                            

 

        Problem Hypertension         I10             Active  48880535 

 

        Problem Anxiety         F41.9           Active  82604794 

 

        Problem Vitamin D         E55.9           Active  05179390 



                deficiency                                         

 

        Problem Moderately severe         F32.2           Active  571476134 



                depression                                         

 

        Problem Depression with         F41.8           Active  10146365 



                anxiety                                         

 

        Problem Heart disease         I51.9           Active  89628958 

 

        Problem Osteoporosis         M81.0           Active  38881971 

 

        Problem Hyperlipemia         E78.5           Active  20429002 

 

        Problem Body temperature         R68.89          Active  756311110 



                low                                             

 

        Problem Seasonal allergic         J30.2           Active  783482008 



                rhinitis,                                         



                unspecified                                         



                trigger                                         

 

        Problem Subclinical         E03.9           Active  28165966 



                hypothyroidism                                         

 

        Problem Decreased hearing         H91.92          Active  263618719 



                of left ear                                         

 

        Problem Daytime somnolence         R40.0           Active  690996789602 

 

        Problem Allergic rhinitis,         J30.9           Active  24561776 



                unspecified                                         



                seasonality,                                         



                unspecified                                         



                trigger                                         

 

        Problem Other chronic pain         G89.29          Active  74024688 

 

        Problem Presence of left         Z96.652         Active  966521260896 



                artificial knee                                         



                joint                                           

 

        Problem Atherosclerotic         I25.10          Active  647838680 



                heart disease of                                         



                native coronary                                         



                artery without                                         



                angina pectoris                                         

 

        Problem Contracture of         M24.562         Active  [id number] 



                left knee                                         

 

        Problem Presence of         Z95.5           Active  193271099 



                coronary                                         



                angioplasty                                         



                implant and graft                                         

 

        Problem Sinus problem         J34.9           Active          

 

        Problem Leukocytosis,         D72.829         Active  040320318 



                unspecified type                                         

 

        Problem Primary         M17.12          Active  [id number] 



                osteoarthritis of                                         



                left knee                                         

 

        Problem Status post total         Z96.652         Active  2423890845352 



                left knee                                         



                replacement                                         

 

        Problem Aftercare         Z47.1           Active  677438216 



                following joint                                         



                replacement                                         



                surgery                                         







ALLERGIES

No Known Allergies



ENCOUNTERS from 1965 to 2021







             Encounter    Location     Date         Provider     Diagnosis

 

             [facility]  [place], [place]                           



                          [address]                             







IMMUNIZATIONS

No Information



SOCIAL HISTORY

Tobacco Use:





                    Social History Observation Description         Date

 

                    Details (start date - stop date) Current Smoker      



Sex Assigned At Birth:





                          Social History Observation Description

 

                          Sex Assigned At Birth     Unknown



PHQ9





                    Question            Answer              Notes

 

                    Little interest or pleasure in doing things More than half t

he days 

 

                    Feeling down, depressed, or hopeless Not at all          

 

                    Trouble falling or staying asleep or sleeping too much Nearl

y every day    

 

                    Feeling tired or having little energy Nearly every day    

 

                    Poor appetite or overeating Not at all          

 

                    Feeling bad about yourself, or that you are a failure, Not a

t all          



                    or have let yourself or your family down                    

 

 

                    Trouble concentrating on things, such as reading the Not at 

all          



                    newspaper or watching television                     

 

                    Moving or speaking so slowly that other people could Not at 

all          



                    have noticed; or the opposite, being so fidgety or          

           



                    restless that you have been moving around a lot more        

             



                    than usual                              

 

                    Total Score         8                   

 

                    Interpretation      Mild Depression     

 

                    Thoughts that you would be better off dead or of Not at all 

         



                    hurting yourself in some way                     



Alcohol Screen





                    Question            Answer              Notes

 

                    Did you have a drink containing alcohol in the past year? No

                  

 

                    Points              0                   

 

                    Interpretation      Negative            



Tobacco Use/Smoking





                    Question            Answer              Notes

 

                    Additional Findings: Tobacco User Moderate cigarette smoker 

(10-19 cigs/day) 

 

                    Are you a           current smoker      







REASON FOR REFERRAL

No Information



VITAL SIGNS

No information



MEDICATIONS







           Medication SIG (Take, Route, Notes      Start Date End Date   Status



                      Frequency, Duration)                                  

 

           Metformin HCl 500 MG 1 tablet with a meal                            

      Active



                      Orally twice a day                                  



                      for 90 days                                  

 

           Amoxicillin-Pot                                             Not-Takin

g



           Clavulanate                                             

 

           Lisinopril 2.5 MG TAKE ONE (1)                                  Activ

e



                      TABLET(S) BY MOUTH                                  



                      ONCE A DAY. for 90                                  

 

           Aspirin 81 MG 1 tablet Orally Once                                  A

ctive



                      a day for 90 days                                  

 

           Plavix 75 MG 1 tablet Orally Once                                  No

t-Taking



                      a day for 90 days                                  

 

           Flonase 50 MCG/ACT 2 spray in each                                  A

ctive



                      nostril Nasally Once                                  



                      a day for 30 day(s)                                  

 

           Fluticasone Propionate                                             No

t-Taking

 

           Hydrocodone-Acetaminophen 1 tablet as needed                         

         Not-Taking



            MG  Orally every 6 hrs                                  

 

           LISINOPRIL 2.5 ONE ORAL DAILY for 90                                 

 Active



                      days                                        

 

           Levothyroxine Sodium 50 TAKE ONE -1 TABLET(S)                        

          Active



           MCG        BY MOUTH EVERY                                  



                      MORNING ON AN EMPTY                                  



                      STOMACH. Orally Once                                  



                      a day for 90 days                                  

 

           Sulfamethoxazole-Trimetho                                            

 Not-Taking



           prim                                                   

 

           Duloxetine HCl 60 MG 1 capsule Orally Once                           

       Active



                      a day for 30 days                                  

 

           Albuterol Sulfate  2 puffs Inhalation             

           Not-Taking



           (90 Base) MCG/ACT every 6 hours prn sob                              

    



                      for 30 days                                  

 

           Lipitor 80 MG 1 tablet Orally Once                                  A

ctive



                      a day for 90 days                                  

 

           BusPIRone HCl 15 MG 1 tablet Orally Twice                            

      Active



                      a day                                       

 

           Prilosec 20 MG 1 capsule Orally Once                                 

 Not-Taking



                      a day                                       

 

           Atorvastatin Calcium                                             Acti

ve

 

           Levothyroxine Sodium 50 1 tablet on an empty                         

         Active



           MCG        stomach in the                                  



                      morning Orally Once a                                  



                      day for 90 days                                  

 

           Cetirizine HCl 10 MG TAKE ONE (1)                                  Ac

tive



                      TABLET(S) BY MOUTH                                  



                      ONCE A DAY. for 90                                  

 

           Carvedilol 6.25 MG as directed Orally                                

  Active



                      twice a day for 90                                  



                      days                                        

 

           Clopidogrel Bisulfate                                             Not

-Taking

 

           Norco 7.5-325 MG 1 PO Q Orally 4-6 HRS                   

     Active



                      PRN PAIN                                    

 

           Duloxetine HCl 60 MG TAKE ONE (1)                                  Ac

tive



                      CAPSULE(S) BY MOUTH                                  



                      ONCE A DAY.                                  

 

           Xarelto 10 MG 1 tablet Orally Once                       

 Active



                      a day for 11 days                                  

 

           Doxycycline Hyclate                                             Not-T

aking







PROCEDURES

No Information



RESULTS

No Results



REASON FOR VISIT

dictation



MEDICAL (GENERAL) HISTORY







                    Type                Description         Date

 

                    Medical History     Hypertension        

 

                    Medical History     Hyperlipemia        

 

                    Medical History     Anxiety             

 

                    Medical History     Heart disease       

 

                    Medical History     Osteoporosis        

 

                    Medical History     Sinus problem       

 

                    Medical History     Hypertension        

 

                    Medical History     Hyperlipemia        

 

                    Medical History     Anxiety             

 

                    Medical History     Heart disease       

 

                    Medical History     Osteoporosis        

 

                    Medical History     Sinus problem       

 

                    Surgical History    Knee Surgery        

 

                    Surgical History    Heart Cath          

 

                    Surgical History    Heart Surgery-Triple Bypass 

 

                    Surgical History    LEFT TOTAL KNEE     2020

 

                    Hospitalization History LEFT TOTAL KNEE     







Goals Section

No Information



Health Concerns

No Information



MEDICAL EQUIPMENT

No Information



MENTAL STATUS

No Information



FUNCTIONAL STATUS

No Information



ASSESSMENTS

No Information



PLAN OF TREATMENT

Next Appt





                                        Details

 

                                        Provider Name:Samantha BRICE Cowan, 2021 08:0

0:00 AM, [address], [address], [place], [address], [phone]

 

                                        Provider Name:Samantha BRICE Cowan, 2021 08:0

0:00 AM, [address], [address], [place], [address], [phone]







Insurance Providers







          Payer Name Payer     Payer     Insured Name Patient   Coverage  Covera

 End



                    Address   Phone               Relationship to Start Date Carl

e



                                                  Insured             

 

          [employer]  [address] [address] [phone] Dayne Wells self                



                     [facility] [address]                                      



                    [address]

## 2021-01-27 NOTE — XMS REPORT
Summarization of Episode Note

                           Created on:2021



Patient:Dayne Wells

Sex:Male

:1965

External Reference #:[id number]





Demographics







                          Address                   [address] [place] [address]

 

                          Home Phone                [phone]

 

                          Mobile Phone              [phone]

 

                          Email Address             [email or web address]

 

                          Preferred Language        en

 

                          Marital Status            Unknown

 

                          Buddhism Affiliation     Unknown

 

                          Race                      Unknown

 

                          Ethnic Group              Unknown









Author







                          Organization              [facility]

 

                          Address                   [address]



                                                    [place] [address]

 

                          Phone                     [phone]









Support







                Name            Relationship    Address         Phone

 

                Russell            Unavailable     [phone]



                                                [place] [address] 

 

                Russell            Unavailable     Unavailable     [phone]









Care Team Providers







                    Name                Role                Phone

 

                    Mon                Unavailable         [phone]









PROBLEMS







        Type    Condition ICD9-CM BNK85-YM Onset   Condition SNOMED Code Notes



                        Code    Code    Dates   Status          

 

        Problem Gastroesophageal         K21.9           Active  398737415 



                reflux disease,                                         



                esophagitis                                         



                presence not                                         



                specified                                         

 

        Problem Osteoarthritis of         M15.9           Active  179116021 



                multiple joints,                                         



                unspecified                                         



                osteoarthritis                                         



                type                                            

 

        Problem Hypertension         I10             Active  64096652 

 

        Problem Anxiety         F41.9           Active  95459548 

 

        Problem Vitamin D         E55.9           Active  05794934 



                deficiency                                         

 

        Problem Moderately severe         F32.2           Active  117668225 



                depression                                         

 

        Problem Depression with         F41.8           Active  48058869 



                anxiety                                         

 

        Problem Heart disease         I51.9           Active  19928679 

 

        Problem Osteoporosis         M81.0           Active  48440653 

 

        Problem Hyperlipemia         E78.5           Active  66143509 

 

        Problem Body temperature         R68.89          Active  241076739 



                low                                             

 

        Problem Seasonal allergic         J30.2           Active  324433570 



                rhinitis,                                         



                unspecified                                         



                trigger                                         

 

        Problem Subclinical         E03.9           Active  06323233 



                hypothyroidism                                         

 

        Problem Decreased hearing         H91.92          Active  875892731 



                of left ear                                         

 

        Problem Daytime somnolence         R40.0           Active  018857246025 

 

        Problem Allergic rhinitis,         J30.9           Active  78106665 



                unspecified                                         



                seasonality,                                         



                unspecified                                         



                trigger                                         

 

        Problem Other chronic pain         G89.29          Active  94995828 

 

        Problem Presence of left         Z96.652         Active  317811065387 



                artificial knee                                         



                joint                                           

 

        Problem Atherosclerotic         I25.10          Active  194787119 



                heart disease of                                         



                native coronary                                         



                artery without                                         



                angina pectoris                                         

 

        Problem Contracture of         M24.562         Active  [id number] 



                left knee                                         

 

        Problem Presence of         Z95.5           Active  347376232 



                coronary                                         



                angioplasty                                         



                implant and graft                                         

 

        Problem Sinus problem         J34.9           Active          

 

        Problem Leukocytosis,         D72.829         Active  238280912 



                unspecified type                                         

 

        Problem Primary         M17.12          Active  [id number] 



                osteoarthritis of                                         



                left knee                                         

 

        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 



                left knee                                         



                replacement                                         

 

        Problem Aftercare         Z47.1           Active  363403819 



                following joint                                         



                replacement                                         



                surgery                                         







ALLERGIES

No Known Allergies



ENCOUNTERS from 1965 to 2021







             Encounter    Location     Date         Provider     Diagnosis

 

             [facility]  [place], [place]                           



                          [address]                             







IMMUNIZATIONS

No Information



SOCIAL HISTORY

Tobacco Use:





                    Social History Observation Description         Date

 

                    Details (start date - stop date) Current Smoker      



Sex Assigned At Birth:





                          Social History Observation Description

 

                          Sex Assigned At Birth     Unknown



PHQ9





                    Question            Answer              Notes

 

                    Little interest or pleasure in doing things More than half t

he days 

 

                    Feeling down, depressed, or hopeless Not at all          

 

                    Trouble falling or staying asleep or sleeping too much Nearl

y every day    

 

                    Feeling tired or having little energy Nearly every day    

 

                    Poor appetite or overeating Not at all          

 

                    Feeling bad about yourself, or that you are a failure, Not a

t all          



                    or have let yourself or your family down                    

 

 

                    Trouble concentrating on things, such as reading the Not at 

all          



                    newspaper or watching television                     

 

                    Moving or speaking so slowly that other people could Not at 

all          



                    have noticed; or the opposite, being so fidgety or          

           



                    restless that you have been moving around a lot more        

             



                    than usual                              

 

                    Total Score         8                   

 

                    Interpretation      Mild Depression     

 

                    Thoughts that you would be better off dead or of Not at all 

         



                    hurting yourself in some way                     



Alcohol Screen





                    Question            Answer              Notes

 

                    Did you have a drink containing alcohol in the past year? No

                  

 

                    Points              0                   

 

                    Interpretation      Negative            



Tobacco Use/Smoking





                    Question            Answer              Notes

 

                    Additional Findings: Tobacco User Moderate cigarette smoker 

(10-19 cigs/day) 

 

                    Are you a           current smoker      







REASON FOR REFERRAL

No Information



VITAL SIGNS

No information



MEDICATIONS







           Medication SIG (Take, Route, Notes      Start Date End Date   Status



                      Frequency, Duration)                                  

 

           Metformin HCl 500 MG 1 tablet with a meal                            

      Active



                      Orally twice a day                                  



                      for 90 days                                  

 

           Amoxicillin-Pot                                             Not-Takin

g



           Clavulanate                                             

 

           Lisinopril 2.5 MG TAKE ONE (1)                                  Activ

e



                      TABLET(S) BY MOUTH                                  



                      ONCE A DAY. for 90                                  

 

           Aspirin 81 MG 1 tablet Orally Once                                  A

ctive



                      a day for 90 days                                  

 

           Plavix 75 MG 1 tablet Orally Once                                  No

t-Taking



                      a day for 90 days                                  

 

           Flonase 50 MCG/ACT 2 spray in each                                  A

ctive



                      nostril Nasally Once                                  



                      a day for 30 day(s)                                  

 

           Fluticasone Propionate                                             No

t-Taking

 

           Hydrocodone-Acetaminophen 1 tablet as needed                         

         Not-Taking



            MG  Orally every 6 hrs                                  

 

           LISINOPRIL 2.5 ONE ORAL DAILY for 90                                 

 Active



                      days                                        

 

           Levothyroxine Sodium 50 TAKE ONE -1 TABLET(S)                        

          Active



           MCG        BY MOUTH EVERY                                  



                      MORNING ON AN EMPTY                                  



                      STOMACH. Orally Once                                  



                      a day for 90 days                                  

 

           Sulfamethoxazole-Trimetho                                            

 Not-Taking



           prim                                                   

 

           Duloxetine HCl 60 MG 1 capsule Orally Once                           

       Active



                      a day for 30 days                                  

 

           Albuterol Sulfate  2 puffs Inhalation             

           Not-Taking



           (90 Base) MCG/ACT every 6 hours prn sob                              

    



                      for 30 days                                  

 

           Lipitor 80 MG 1 tablet Orally Once                                  A

ctive



                      a day for 90 days                                  

 

           BusPIRone HCl 15 MG 1 tablet Orally Twice                            

      Active



                      a day                                       

 

           Prilosec 20 MG 1 capsule Orally Once                                 

 Not-Taking



                      a day                                       

 

           Atorvastatin Calcium                                             Acti

ve

 

           Levothyroxine Sodium 50 1 tablet on an empty                         

         Active



           MCG        stomach in the                                  



                      morning Orally Once a                                  



                      day for 90 days                                  

 

           Cetirizine HCl 10 MG TAKE ONE (1)                                  Ac

tive



                      TABLET(S) BY MOUTH                                  



                      ONCE A DAY. for 90                                  

 

           Carvedilol 6.25 MG as directed Orally                                

  Active



                      twice a day for 90                                  



                      days                                        

 

           Clopidogrel Bisulfate                                             Not

-Taking

 

           Norco 7.5-325 MG 1 PO Q Orally 4-6 HRS                   

     Active



                      PRN PAIN                                    

 

           Duloxetine HCl 60 MG TAKE ONE (1)                                  Ac

tive



                      CAPSULE(S) BY MOUTH                                  



                      ONCE A DAY.                                  

 

           Xarelto 10 MG 1 tablet Orally Once                       

 Active



                      a day for 11 days                                  

 

           Doxycycline Hyclate                                             Not-T

aking







PROCEDURES

No Information



RESULTS

No Results



REASON FOR VISIT

URGENT SURGICAL CLEARANCE



MEDICAL (GENERAL) HISTORY







                    Type                Description         Date

 

                    Medical History     Hypertension        

 

                    Medical History     Hyperlipemia        

 

                    Medical History     Anxiety             

 

                    Medical History     Heart disease       

 

                    Medical History     Osteoporosis        

 

                    Medical History     Sinus problem       

 

                    Medical History     Hypertension        

 

                    Medical History     Hyperlipemia        

 

                    Medical History     Anxiety             

 

                    Medical History     Heart disease       

 

                    Medical History     Osteoporosis        

 

                    Medical History     Sinus problem       

 

                    Surgical History    Knee Surgery        

 

                    Surgical History    Heart Cath          

 

                    Surgical History    Heart Surgery-Triple Bypass 

 

                    Surgical History    LEFT TOTAL KNEE     2020

 

                    Hospitalization History LEFT TOTAL KNEE     







Goals Section

No Information



Health Concerns

No Information



MEDICAL EQUIPMENT

No Information



MENTAL STATUS

No Information



FUNCTIONAL STATUS

No Information



ASSESSMENTS

No Information



PLAN OF TREATMENT

Next Appt





                                        Details

 

                                        Provider Name:Samantha BRICE Chapo, 2021 08:0

0:00 AM, [address], [address], [place], [address], [phone]

 

                                        Provider Name:Samantha BRICE Cowan, 2021 08:0

0:00 AM, [address], [address], [place], [address], [phone]







Insurance Providers







          Payer Name Payer     Payer     Insured Name Patient   Coverage  Covera

 End



                    Address   Phone               Relationship to Start Date Carl

e



                                                  Insured             

 

          [employer]  [address] [address] [phone] Dayne Wells self                



                     [facility] [address]                                      



                    [address]

## 2021-01-27 NOTE — XMS REPORT
Summarization of Episode Note

                           Created on:2021



Patient:Dayne Wells

Sex:Male

:1965

External Reference #:[id number]





Demographics







                          Address                   [address] [place] [address]

 

                          Home Phone                [phone]

 

                          Mobile Phone              [phone]

 

                          Email Address             [email or web address]

 

                          Preferred Language        en

 

                          Marital Status            Unknown

 

                          Church Affiliation     Unknown

 

                          Race                      Unknown

 

                          Ethnic Group              Unknown









Author







                          Organization              [facility]

 

                          Address                   [address]



                                                    [place] [address]

 

                          Phone                     [phone]









Support







                Name            Relationship    Address         Phone

 

                Russell            Unavailable     [phone]



                                                [place] [address] 

 

                Russell            Unavailable     Unavailable     [phone]









Care Team Providers







                    Name                Role                Phone

 

                    Mon                Unavailable         [phone]









PROBLEMS







        Type    Condition ICD9-CM AAS94-PA Onset   Condition SNOMED Code Notes



                        Code    Code    Dates   Status          

 

        Problem Gastroesophageal         K21.9           Active  729431876 



                reflux disease,                                         



                esophagitis                                         



                presence not                                         



                specified                                         

 

        Problem Osteoarthritis of         M15.9           Active  195707914 



                multiple joints,                                         



                unspecified                                         



                osteoarthritis                                         



                type                                            

 

        Problem Hypertension         I10             Active  49626127 

 

        Problem Anxiety         F41.9           Active  93144611 

 

        Problem Vitamin D         E55.9           Active  00063190 



                deficiency                                         

 

        Problem Moderately severe         F32.2           Active  200325019 



                depression                                         

 

        Problem Depression with         F41.8           Active  64621163 



                anxiety                                         

 

        Problem Heart disease         I51.9           Active  42604923 

 

        Problem Osteoporosis         M81.0           Active  08506069 

 

        Problem Hyperlipemia         E78.5           Active  42623153 

 

        Problem Body temperature         R68.89          Active  636510727 



                low                                             

 

        Problem Seasonal allergic         J30.2           Active  607696245 



                rhinitis,                                         



                unspecified                                         



                trigger                                         

 

        Problem Subclinical         E03.9           Active  99028362 



                hypothyroidism                                         

 

        Problem Decreased hearing         H91.92          Active  659617389 



                of left ear                                         

 

        Problem Daytime somnolence         R40.0           Active  558395519051 

 

        Problem Allergic rhinitis,         J30.9           Active  27927278 



                unspecified                                         



                seasonality,                                         



                unspecified                                         



                trigger                                         

 

        Problem Other chronic pain         G89.29          Active  29492899 

 

        Problem Presence of left         Z96.652         Active  898444075860 



                artificial knee                                         



                joint                                           

 

        Problem Atherosclerotic         I25.10          Active  682625822 



                heart disease of                                         



                native coronary                                         



                artery without                                         



                angina pectoris                                         

 

        Problem Contracture of         M24.562         Active  [id number] 



                left knee                                         

 

        Problem Presence of         Z95.5           Active  129977285 



                coronary                                         



                angioplasty                                         



                implant and graft                                         

 

        Problem Sinus problem         J34.9           Active          

 

        Problem Leukocytosis,         D72.829         Active  364966977 



                unspecified type                                         

 

        Problem Primary         M17.12          Active  185855570796373 



                osteoarthritis of                                         



                left knee                                         

 

        Problem Status post total         Z96.652         Active  4638337953632 



                left knee                                         



                replacement                                         

 

        Problem Aftercare         Z47.1           Active  720850550 



                following joint                                         



                replacement                                         



                surgery                                         







ALLERGIES

No Known Allergies



ENCOUNTERS from 1965 to 2021







             Encounter    Location     Date         Provider     Diagnosis

 

             [facility]  [place], [place]                           



                          [address]                             







IMMUNIZATIONS

No Information



SOCIAL HISTORY

Tobacco Use:





                    Social History Observation Description         Date

 

                    Details (start date - stop date) Current Smoker      



Sex Assigned At Birth:





                          Social History Observation Description

 

                          Sex Assigned At Birth     Unknown



PHQ9





                    Question            Answer              Notes

 

                    Little interest or pleasure in doing things More than half t

he days 

 

                    Feeling down, depressed, or hopeless Not at all          

 

                    Trouble falling or staying asleep or sleeping too much Nearl

y every day    

 

                    Feeling tired or having little energy Nearly every day    

 

                    Poor appetite or overeating Not at all          

 

                    Feeling bad about yourself, or that you are a failure, Not a

t all          



                    or have let yourself or your family down                    

 

 

                    Trouble concentrating on things, such as reading the Not at 

all          



                    newspaper or watching television                     

 

                    Moving or speaking so slowly that other people could Not at 

all          



                    have noticed; or the opposite, being so fidgety or          

           



                    restless that you have been moving around a lot more        

             



                    than usual                              

 

                    Total Score         8                   

 

                    Interpretation      Mild Depression     

 

                    Thoughts that you would be better off dead or of Not at all 

         



                    hurting yourself in some way                     



Alcohol Screen





                    Question            Answer              Notes

 

                    Did you have a drink containing alcohol in the past year? No

                  

 

                    Points              0                   

 

                    Interpretation      Negative            



Tobacco Use/Smoking





                    Question            Answer              Notes

 

                    Additional Findings: Tobacco User Moderate cigarette smoker 

(10-19 cigs/day) 

 

                    Are you a           current smoker      







REASON FOR REFERRAL

No Information



VITAL SIGNS

No information



MEDICATIONS







           Medication SIG (Take, Route, Notes      Start Date End Date   Status



                      Frequency, Duration)                                  

 

           Metformin HCl 500 MG 1 tablet with a meal                            

      Active



                      Orally twice a day                                  



                      for 90 days                                  

 

           Amoxicillin-Pot                                             Not-Takin

g



           Clavulanate                                             

 

           Lisinopril 2.5 MG TAKE ONE (1)                                  Activ

e



                      TABLET(S) BY MOUTH                                  



                      ONCE A DAY. for 90                                  

 

           Aspirin 81 MG 1 tablet Orally Once                                  A

ctive



                      a day for 90 days                                  

 

           Plavix 75 MG 1 tablet Orally Once                                  No

t-Taking



                      a day for 90 days                                  

 

           Flonase 50 MCG/ACT 2 spray in each                                  A

ctive



                      nostril Nasally Once                                  



                      a day for 30 day(s)                                  

 

           Fluticasone Propionate                                             No

t-Taking

 

           Hydrocodone-Acetaminophen 1 tablet as needed                         

         Not-Taking



            MG  Orally every 6 hrs                                  

 

           LISINOPRIL 2.5 ONE ORAL DAILY for 90                                 

 Active



                      days                                        

 

           Levothyroxine Sodium 50 TAKE ONE -1 TABLET(S)                        

          Active



           MCG        BY MOUTH EVERY                                  



                      MORNING ON AN EMPTY                                  



                      STOMACH. Orally Once                                  



                      a day for 90 days                                  

 

           Sulfamethoxazole-Trimetho                                            

 Not-Taking



           prim                                                   

 

           Duloxetine HCl 60 MG 1 capsule Orally Once                           

       Active



                      a day for 30 days                                  

 

           Albuterol Sulfate  2 puffs Inhalation             

           Not-Taking



           (90 Base) MCG/ACT every 6 hours prn sob                              

    



                      for 30 days                                  

 

           Lipitor 80 MG 1 tablet Orally Once                                  A

ctive



                      a day for 90 days                                  

 

           BusPIRone HCl 15 MG 1 tablet Orally Twice                            

      Active



                      a day                                       

 

           Prilosec 20 MG 1 capsule Orally Once                                 

 Not-Taking



                      a day                                       

 

           Atorvastatin Calcium                                             Acti

ve

 

           Levothyroxine Sodium 50 1 tablet on an empty                         

         Active



           MCG        stomach in the                                  



                      morning Orally Once a                                  



                      day for 90 days                                  

 

           Cetirizine HCl 10 MG TAKE ONE (1)                                  Ac

tive



                      TABLET(S) BY MOUTH                                  



                      ONCE A DAY. for 90                                  

 

           Carvedilol 6.25 MG as directed Orally                                

  Active



                      twice a day for 90                                  



                      days                                        

 

           Clopidogrel Bisulfate                                             Not

-Taking

 

           Norco 7.5-325 MG 1 PO Q Orally 4-6 HRS                   

     Active



                      PRN PAIN                                    

 

           Duloxetine HCl 60 MG TAKE ONE (1)                                  Ac

tive



                      CAPSULE(S) BY MOUTH                                  



                      ONCE A DAY.                                  

 

           Xarelto 10 MG 1 tablet Orally Once                       

 Active



                      a day for 11 days                                  

 

           Doxycycline Hyclate                                             Not-T

aking







PROCEDURES

No Information



RESULTS

No Results



REASON FOR VISIT

CPM ORDER



MEDICAL (GENERAL) HISTORY







                    Type                Description         Date

 

                    Medical History     Hypertension        

 

                    Medical History     Hyperlipemia        

 

                    Medical History     Anxiety             

 

                    Medical History     Heart disease       

 

                    Medical History     Osteoporosis        

 

                    Medical History     Sinus problem       

 

                    Medical History     Hypertension        

 

                    Medical History     Hyperlipemia        

 

                    Medical History     Anxiety             

 

                    Medical History     Heart disease       

 

                    Medical History     Osteoporosis        

 

                    Medical History     Sinus problem       

 

                    Surgical History    Knee Surgery        

 

                    Surgical History    Heart Cath          

 

                    Surgical History    Heart Surgery-Triple Bypass 

 

                    Surgical History    LEFT TOTAL KNEE     2020

 

                    Hospitalization History LEFT TOTAL KNEE     







Goals Section

No Information



Health Concerns

No Information



MEDICAL EQUIPMENT

No Information



MENTAL STATUS

No Information



FUNCTIONAL STATUS

No Information



ASSESSMENTS

No Information



PLAN OF TREATMENT

Next Appt





                                        Details

 

                                        Provider Name:Samantha BRICE Cowan, 2021 08:0

0:00 AM, [address], [address], [place], [address], [phone]

 

                                        Provider Name:Samantha BRICE Cowan, 2021 08:0

0:00 AM, [address], [address], [place], [address], [phone]







Insurance Providers







          Payer Name Payer     Payer     Insured Name Patient   Coverage  Covera

 End



                    Address   Phone               Relationship to Start Date Carl

e



                                                  Insured             

 

          [employer]  [address] [address] [phone] Dayne Wells self                



                     [address] [facility] [address]                                      



                    [phone]

## 2021-02-01 NOTE — OP
Date of Procedure:  01/27/2021



Surgeon:  Neeraj Mon MD



Preoperative Diagnosis:  Left total knee arthroplasty contracture.



Postoperative Diagnosis:  Left total knee arthroplasty contracture.



Procedure Performed:  Manipulation under anesthesia, left total knee 
arthroplasty.



Anesthesia:  General LMA.



Fluids:  Per Anesthesia record.



Estimated Blood Loss:  None.



Complications:  None.



Indication For Procedure:  Dayne is a 56-year-old male who presented to my 
clinic with stiffness after total knee arthroplasty.  Its over 6 weeks ago.  The
patient had been working with physical therapy, but unfortunately, he had 
continued limitation with flexion of his knee.  I discussed with the patient at 
length risks and benefits associated with manipulation under anesthesia.  He 
expressed understanding and elected to proceed with operative treatment.



Description Of Procedure:  After informed consent was obtained, the patient was 
identified in the preoperative holding area.  The left lower extremity was 
marked.  The patient was then taken to the PACU, where he underwent an adductor 
canal block by Anesthesia to aid in postoperative pain control.  The patient was
then brought back to the operating room, transferred to the operative table in 
supine fashion and placed under general LMA anesthesia.  The left lower 
extremity was then examined.  The patient was noted to have approximately 0 to 
90 degrees prior to the manipulation.  With the hip flexed 90 degrees, gentle 
pressure was placed on the proximal tibia to aid with flexion of the knee. 
During manipulation the knee after manipulation pops were felt with the release 
of scar tissue.  The knee was flexion range was increased to 125 degrees.  The 
patient was then awakened and transferred back in stable condition.



Postoperative Plan:  We will proceed with aggressive postop physical therapy.  
The patient also was given a CPM machine to work on maintenance of his range of 
motion.



CV/MODL

DD:  01/27/2021 10:45:34   Voice ID:  [id number]

DT:  01/27/2021 11:45:43   Report ID:  [id number]

CHEIKH

## 2022-04-08 NOTE — P.BOP
Preoperative diagnosis: Left total knee arthroplasty contracture


Postoperative diagnosis: same


Primary procedure: manipulation under anesthesia left TKA


Assistant: NONE,NONE


Estimated blood loss: none


Specimen: none


Findings: see dictation


Anesthesia: General


Complications: None


Implants: none


Fluids & blood products: per anesthesia record


Transferred to: Recovery Room


Condition: Good (0) indicator not present